# Patient Record
Sex: FEMALE | Race: WHITE | NOT HISPANIC OR LATINO | URBAN - METROPOLITAN AREA
[De-identification: names, ages, dates, MRNs, and addresses within clinical notes are randomized per-mention and may not be internally consistent; named-entity substitution may affect disease eponyms.]

---

## 2022-01-06 ENCOUNTER — APPOINTMENT (OUTPATIENT)
Dept: RADIOLOGY | Facility: CLINIC | Age: 52
End: 2022-01-06
Payer: COMMERCIAL

## 2022-01-06 ENCOUNTER — OFFICE VISIT (OUTPATIENT)
Dept: URGENT CARE | Facility: CLINIC | Age: 52
End: 2022-01-06
Payer: COMMERCIAL

## 2022-01-06 VITALS
HEART RATE: 99 BPM | TEMPERATURE: 99.5 F | OXYGEN SATURATION: 100 % | HEIGHT: 64 IN | WEIGHT: 150.6 LBS | BODY MASS INDEX: 25.71 KG/M2 | RESPIRATION RATE: 16 BRPM

## 2022-01-06 DIAGNOSIS — M79.645 FINGER PAIN, LEFT: ICD-10-CM

## 2022-01-06 DIAGNOSIS — S63.259A DISLOCATION OF FINGER, INITIAL ENCOUNTER: Primary | ICD-10-CM

## 2022-01-06 DIAGNOSIS — S63.259A DISLOCATION OF FINGER, INITIAL ENCOUNTER: ICD-10-CM

## 2022-01-06 PROCEDURE — 73140 X-RAY EXAM OF FINGER(S): CPT

## 2022-01-06 PROCEDURE — 29130 APPL FINGER SPLINT STATIC: CPT | Performed by: PHYSICIAN ASSISTANT

## 2022-01-06 PROCEDURE — 99213 OFFICE O/P EST LOW 20 MIN: CPT | Performed by: PHYSICIAN ASSISTANT

## 2022-01-06 RX ORDER — ALPRAZOLAM 1 MG/1
1 TABLET ORAL 3 TIMES DAILY
COMMUNITY
Start: 2021-12-12

## 2022-01-06 RX ORDER — TRAMADOL HYDROCHLORIDE 50 MG/1
TABLET ORAL
COMMUNITY
Start: 2021-12-15

## 2022-01-06 RX ORDER — CARISOPRODOL 350 MG/1
350 TABLET ORAL DAILY
COMMUNITY
Start: 2021-12-11

## 2022-01-06 RX ORDER — QUETIAPINE FUMARATE 25 MG/1
25 TABLET, FILM COATED ORAL 4 TIMES DAILY
COMMUNITY
Start: 2021-12-01

## 2022-01-06 RX ORDER — LIDOCAINE HYDROCHLORIDE 10 MG/ML
5 INJECTION, SOLUTION EPIDURAL; INFILTRATION; INTRACAUDAL; PERINEURAL ONCE
Status: COMPLETED | OUTPATIENT
Start: 2022-01-06 | End: 2022-01-06

## 2022-01-06 RX ORDER — ATORVASTATIN CALCIUM 20 MG/1
20 TABLET, FILM COATED ORAL DAILY
COMMUNITY
Start: 2021-12-11

## 2022-01-06 RX ORDER — GABAPENTIN 600 MG/1
600 TABLET ORAL 4 TIMES DAILY
COMMUNITY
Start: 2021-12-29

## 2022-01-06 RX ADMIN — LIDOCAINE HYDROCHLORIDE 5 ML: 10 INJECTION, SOLUTION EPIDURAL; INFILTRATION; INTRACAUDAL; PERINEURAL at 15:28

## 2022-01-06 NOTE — LETTER
January 6, 2022     Patient: Onel Gallagher   YOB: 1970   Date of Visit: 1/6/2022       To Whom it May Concern:    Onel Gallagher was seen in my clinic on 1/6/2022  She may return to work on 1/9/2022  If you have any questions or concerns, please don't hesitate to call           Sincerely,          [facility]        CC: No Recipients

## 2022-01-06 NOTE — PROGRESS NOTES
NAME: Denisse Harris is a 45 y o  female  : 1970    MRN: [id number]      Assessment and Plan   Dislocation of finger, initial encounter [S63 259A]  1  Dislocation of finger, initial encounter  lidocaine (PF) (XYLOCAINE-MPF) 1 % injection 5 mL    XR finger left fifth digit-pinkie    Ambulatory referral to Orthopedic Surgery    Orthopedic injury treatment   2  Finger pain, left  XR finger left fifth digit-pinkie     Administrations This Visit     lidocaine (PF) (XYLOCAINE-MPF) 1 % injection 5 mL     Admin Date  2022 Action  Given Dose  5 mL Route  Infiltration Administered By  Cherylynn Backer            x-ray left fifth finger: dislocation at the PIP joint no fractures visualized  Post reduction films show realigned but possible tendon/ligament damage given valgus deformity  Placed in aluminum finger splint and f/u with ortho       Ice, elevate, continue ibuprofen/tylenol  Wear splint until you see ortho  F/u with ortho- referral placed for you today  ER if finger goes completely numb or turns white/ gray  She acknowledges  Patient Instructions   Patient Instructions   Ice, elevate  Continue OTC meds   Wear splint   F/u with ortho - referral placed for you today     Proceed to ER if symptoms worsen  Chief Complaint     Chief Complaint   Patient presents with    Hand Pain     Onset yesterday slammed left 5th finger in door  Takin gtylenol and ibuprofen  Finger discolored and swollen         History of Present Illness   Patient with no pertinent pmhx presents with daughter complaining of left 5th finger pain x 1 day  Reports yesterday she got her 5th finger caught in a shutting door  Reports she has been having pain and swelling to the finger since  No numbness/tingling to the tip  Has been applying ice and taking ibuprofen/tylenol  Throbbing pain at rest  Reports she is unable to bend it  Review of Systems   Review of Systems   Constitutional: Negative for chills and fever  Gastrointestinal: Negative for nausea and vomiting  Musculoskeletal:        Left 5th finger pain    Neurological: Negative for weakness and numbness  Current Medications       Current Outpatient Medications:     ALPRAZolam (XANAX) 1 mg tablet, Take 1 mg by mouth 3 (three) times a day, Disp: , Rfl:     atorvastatin (LIPITOR) 20 mg tablet, Take 20 mg by mouth daily, Disp: , Rfl:     carisoprodol (SOMA) 350 mg tablet, Take 350 mg by mouth daily, Disp: , Rfl:     gabapentin (NEURONTIN) 600 MG tablet, Take 600 mg by mouth 4 (four) times a day, Disp: , Rfl:     hyoscyamine (LEVSIN/SL) 0 125 mg SL tablet, PLACE AND DISSOLVE ONE TABLET UNDER THE TONGUE THREE TIMES A DAY AS NEEDED FOR ABDOMINAL PAIN, Disp: , Rfl:     QUEtiapine (SEROquel) 25 mg tablet, Take 25 mg by mouth 4 (four) times a day, Disp: , Rfl:     traMADol (ULTRAM) 50 mg tablet, TAKE 1 TABLET BY MOUTH 3 TIMES A DAY (EARLIEST FILL 10/24/21), Disp: , Rfl:   No current facility-administered medications for this visit  Current Allergies     Allergies as of 01/06/2022 - Reviewed 01/06/2022   Allergen Reaction Noted    Demerol [meperidine] Vomiting 01/06/2022              History reviewed  No pertinent past medical history  History reviewed  No pertinent surgical history  No family history on file  Medications have been verified      The following portions of the patient's history were reviewed and updated as appropriate: allergies, current medications, past family history, past medical history, past social history, past surgical history and problem list     Objective   Pulse 99   Temp 99 5 °F (37 5 °C) (Tympanic)   Resp 16   Ht 5' 3 5" (1 613 m)   Wt 68 3 kg (150 lb 9 6 oz)   SpO2 100%   BMI 26 26 kg/m²      Orthopedic injury treatment    Date/Time: 1/6/2022 3:45 PM  Performed by: Duarte Fox PA-C  Authorized by: Duarte Fox PA-C     Patient Location:  Clinic  Verbal consent obtained?: Yes    Risks and benefits: Risks, benefits and alternatives were discussed    Consent given by:  Patient  Patient states understanding of procedure being performed: Yes    Patient identity confirmed:  Verbally with patient  Time out: Immediately prior to the procedure a time out was called    Injury location:  Finger  Location details:  Left little finger  Injury type:  Dislocation  Neurovascular status: Neurovascularly intact    Distal perfusion: normal    Neurological function: normal    Range of motion: reduced    Local anesthesia used?: Yes    Anesthesia:  Digital block  Local anesthetic:  Lidocaine 1% without epinephrine  Anesthetic total (ml):  3  Manipulation performed?: Yes    Reduction successful?: Yes    Confirmation: Reduction confirmed by x-ray    Immobilization:  Splint  Splint type:  Finger splint, static  Supplies used:  Aluminum splint  Neurovascular status: Neurovascularly intact    Distal perfusion: normal    Neurological function: normal    Range of motion: normal    Patient tolerance:  Patient tolerated the procedure well with no immediate complications        Physical Exam     Physical Exam  Vitals and nursing note reviewed  Constitutional:       General: She is not in acute distress  Appearance: Normal appearance  She is not ill-appearing, toxic-appearing or diaphoretic  Cardiovascular:      Rate and Rhythm: Normal rate and regular rhythm  Pulmonary:      Effort: Pulmonary effort is normal  No respiratory distress  Musculoskeletal:      Comments: Left 5th finger: edema and ecchymosis to the proximal and middle phalanx  Small abrasion overlying as well - no gaping wound or any active bleeding  No subungual hematoma  TTP at the proximal, middle and distal phalanx  NTTP at the MCP joint or anywhere in the hand  Unable to flex finger at all  Holds it in extension  Other fingers able to fully flex  Sensation to light touch intact throughout the finger   Cap refill at the distal tip <2 seconds    Neurological:      Mental Status: She is alert and oriented to person, place, and time

## 2022-01-11 ENCOUNTER — OFFICE VISIT (OUTPATIENT)
Dept: OBGYN CLINIC | Facility: CLINIC | Age: 52
End: 2022-01-11

## 2022-01-11 VITALS
DIASTOLIC BLOOD PRESSURE: 80 MMHG | BODY MASS INDEX: 26.46 KG/M2 | WEIGHT: 155 LBS | SYSTOLIC BLOOD PRESSURE: 128 MMHG | HEIGHT: 64 IN

## 2022-01-11 DIAGNOSIS — S63.287A DISLOCATION OF PROXIMAL INTERPHALANGEAL JOINT OF LEFT LITTLE FINGER, INITIAL ENCOUNTER: Primary | ICD-10-CM

## 2022-01-11 PROBLEM — S63.297A: Status: ACTIVE | Noted: 2022-01-11

## 2022-01-11 PROCEDURE — 99203 OFFICE O/P NEW LOW 30 MIN: CPT | Performed by: ORTHOPAEDIC SURGERY

## 2022-01-11 NOTE — PROGRESS NOTES
Assessment:     1  Dislocation of proximal interphalangeal joint of left little finger, initial encounter        Plan:     Problem List Items Addressed This Visit        Musculoskeletal and Integument    Dislocation of proximal interphalangeal joint of left little finger - Primary     44 y/o female with an acute dislocation of her DIP joint of the left small finger sustained on 1/5/2022  Patient was instructed to perform ROM exercises for her small finger PIP and DIP joints as shown in the office today  She was placed in kral taping today, may remove for hygiene  OTC meds and ice prn for pain relief  Work note provided for the patient today, keeping the patient out of work until 1/25/2022, as she works as a  and is unable to perform her duties at this time  She will follow up in 6 weeks for re evaluation of symptoms  All patient's questions were answered to her satisfaction  This note is created using dictation transcription  It may contain typographical errors, grammatical errors, improperly dictated words, background noise and other errors  Subjective:     Patient ID: Arturo Vera is a 45 y o  female  Chief Complaint:  44 y/o female who presents today for an orthopedic surgery consultation visit at the request of Antionette Adams PA-C on 1/6/2022 for acute left small finger dislocation sustained on 1/5/2022 after she slammed the finger jef car door  Today, patient notes continued soreness and pain about the small finger  She has been using a splint as instructed by the urgent care  She has also been taking OTC meds prn for pain relief  No numbness or tingling  No fevers or chills  Information on patient's intake form was reviewed  Allergy:  Allergies   Allergen Reactions    Demerol [Meperidine] Vomiting     Medications:  all current active meds have been reviewed  Past Medical History:  History reviewed  No pertinent past medical history  Past Surgical History:  History reviewed   No pertinent surgical history  Family History:  History reviewed  No pertinent family history  Social History:  Social History     Substance and Sexual Activity   Alcohol Use None     Social History     Substance and Sexual Activity   Drug Use Not on file     Social History     Tobacco Use   Smoking Status Current Every Day Smoker   Smokeless Tobacco Never Used     Review of Systems   Constitutional: Negative for chills, fever and unexpected weight change  HENT: Negative for hearing loss, nosebleeds and sore throat  Eyes: Negative for pain, redness and visual disturbance  Respiratory: Negative for cough, shortness of breath and wheezing  Cardiovascular: Negative for chest pain, palpitations and leg swelling  Gastrointestinal: Negative for abdominal distention, nausea and vomiting  Endocrine: Negative for polydipsia and polyuria  Genitourinary: Negative for dysuria and hematuria  Musculoskeletal: Positive for arthralgias (Left little finger) and joint swelling (Left little finger)  Skin: Negative for rash and wound  Neurological: Negative for dizziness, numbness and headaches  Psychiatric/Behavioral: Negative for decreased concentration and suicidal ideas  Objective:  BP Readings from Last 1 Encounters:   01/11/22 128/80      Wt Readings from Last 1 Encounters:   01/11/22 70 3 kg (155 lb)      BMI:   Estimated body mass index is 27 03 kg/m² as calculated from the following:    Height as of this encounter: 5' 3 5" (1 613 m)  Weight as of this encounter: 70 3 kg (155 lb)  BSA:   Estimated body surface area is 1 75 meters squared as calculated from the following:    Height as of this encounter: 5' 3 5" (1 613 m)  Weight as of this encounter: 70 3 kg (155 lb)  Physical Exam  Vitals and nursing note reviewed  Constitutional:       Appearance: Normal appearance  She is well-developed  HENT:      Head: Normocephalic and atraumatic        Right Ear: External ear normal       Left Ear: External ear normal    Eyes:      Extraocular Movements: Extraocular movements intact  Conjunctiva/sclera: Conjunctivae normal    Pulmonary:      Effort: Pulmonary effort is normal    Musculoskeletal:      Cervical back: Neck supple  Skin:     General: Skin is warm and dry  Neurological:      Mental Status: She is alert and oriented to person, place, and time  Deep Tendon Reflexes: Reflexes are normal and symmetric  Psychiatric:         Mood and Affect: Mood normal          Behavior: Behavior normal        Left Hand Exam     Tenderness   Left hand tenderness location: small finger  Range of Motion   Hand   PIP Little: abnormal   DIP Little: abnormal     Other   Erythema: absent  Sensation: normal  Pulse: present    Comments:  Ecchymosis noted about the small finger  Limited ROM of the small finger due to pain and swelling  I have personally reviewed pertinent films in PACS and my interpretation is x rays right hand, pre reduction 1/6/2022 reveals dislocation of the PIP joint of the small finger  Also, reviewed x rays of the right hand, post reduction on 1/6/2022 which reveals interval reduction of the PIP joint of the small finger without acute fracture  Well maintained joint line        Scribe Attestation    I,:  Xin Vang am acting as a scribe while in the presence of the attending physician :       I,:  Tin Ashley MD personally performed the services described in this documentation    as scribed in my presence :

## 2022-01-11 NOTE — LETTER
January 11, 2022     Patient: Carroll Bentley   YOB: 1970   Date of Visit: 1/11/2022       To Whom it May Concern:    Carroll Bentley is under my professional care  She was seen in my office on 1/11/2022  She is to remain out of work until 1/25/2022  She may return to work after this date without limitations or restrictions  If you have any questions or concerns, please don't hesitate to call           Sincerely,          Chris Griffith MD        CC: No Recipients

## 2022-01-11 NOTE — ASSESSMENT & PLAN NOTE
44 y/o female with an acute dislocation of her DIP joint of the left small finger sustained on 1/5/2022  Patient was instructed to perform ROM exercises for her small finger PIP and DIP joints as shown in the office today  She was placed in karl taping today, may remove for hygiene  OTC meds and ice prn for pain relief  Work note provided for the patient today, keeping the patient out of work until 1/25/2022, as she works as a  and is unable to perform her duties at this time  She will follow up in 6 weeks for re evaluation of symptoms  All patient's questions were answered to her satisfaction  This note is created using dictation transcription  It may contain typographical errors, grammatical errors, improperly dictated words, background noise and other errors

## 2022-01-11 NOTE — LETTER
January 11, 2022     Patient: Carmel Werner   YOB: 1970   Date of Visit: 1/11/2022       To Whom it May Concern:    Carmel Werner is under my professional care  She was seen in my office on 1/11/2022  She is to remain out of work until 11/25/2022  She may then return to work without limitations  If you have any questions or concerns, please don't hesitate to call           Sincerely,          Stephan Torres MD        CC: No Recipients

## 2022-01-17 ENCOUNTER — TELEPHONE (OUTPATIENT)
Dept: OBGYN CLINIC | Facility: HOSPITAL | Age: 52
End: 2022-01-17

## 2022-01-17 NOTE — TELEPHONE ENCOUNTER
Patient would like to return to work sooner than the last note states  Patient states Dr Luly Vazquez told her to call to request another note  Please change note to advise she can return to work 01/20/22  Please advise  Patient can  the letter when ready      Callback OB#[phone]

## 2022-01-17 NOTE — LETTER
2022     Patient: Edilma Saldana   YOB: 1970   Date of Visit: 2022       To Whom it May Concern:    Edilma Saldana is under my professional care  She can return to work with no restrictions on 22  If you have any questions or concerns, please don't hesitate to call           Sincerely,          Ladonna Esparza MD        CC: No Recipients

## 2023-10-10 ENCOUNTER — HOSPITAL ENCOUNTER (EMERGENCY)
Facility: HOSPITAL | Age: 53
Discharge: HOME | End: 2023-10-10
Attending: EMERGENCY MEDICINE | Admitting: EMERGENCY MEDICINE
Payer: MEDICARE

## 2023-10-10 VITALS
DIASTOLIC BLOOD PRESSURE: 71 MMHG | SYSTOLIC BLOOD PRESSURE: 100 MMHG | TEMPERATURE: 97.4 F | OXYGEN SATURATION: 97 % | HEART RATE: 89 BPM | RESPIRATION RATE: 20 BRPM

## 2023-10-10 DIAGNOSIS — G89.29 CHRONIC NECK PAIN: Primary | ICD-10-CM

## 2023-10-10 DIAGNOSIS — M54.2 CHRONIC NECK PAIN: Primary | ICD-10-CM

## 2023-10-10 PROCEDURE — 99282 EMERGENCY DEPT VISIT SF MDM: CPT

## 2023-10-10 PROCEDURE — 99284 EMERGENCY DEPT VISIT MOD MDM: CPT | Performed by: PHYSICIAN ASSISTANT

## 2023-10-10 PROCEDURE — 96372 THER/PROPH/DIAG INJ SC/IM: CPT

## 2023-10-10 RX ORDER — LIDOCAINE 50 MG/G
1 PATCH TOPICAL ONCE
Status: DISCONTINUED | OUTPATIENT
Start: 2023-10-10 | End: 2023-10-10 | Stop reason: HOSPADM

## 2023-10-10 RX ORDER — METHOCARBAMOL 500 MG/1
500 TABLET, FILM COATED ORAL ONCE
Status: COMPLETED | OUTPATIENT
Start: 2023-10-10 | End: 2023-10-10

## 2023-10-10 RX ORDER — KETOROLAC TROMETHAMINE 30 MG/ML
30 INJECTION, SOLUTION INTRAMUSCULAR; INTRAVENOUS ONCE
Status: COMPLETED | OUTPATIENT
Start: 2023-10-10 | End: 2023-10-10

## 2023-10-10 RX ADMIN — LIDOCAINE 1 PATCH: 700 PATCH TOPICAL at 16:18

## 2023-10-10 RX ADMIN — METHOCARBAMOL 500 MG: 500 TABLET ORAL at 16:17

## 2023-10-10 RX ADMIN — KETOROLAC TROMETHAMINE 30 MG: 30 INJECTION, SOLUTION INTRAMUSCULAR; INTRAVENOUS at 16:18

## 2023-12-22 ENCOUNTER — TELEPHONE (OUTPATIENT)
Dept: PSYCHIATRY | Facility: CLINIC | Age: 53
End: 2023-12-22

## 2024-04-08 ENCOUNTER — TELEPHONE (OUTPATIENT)
Dept: PSYCHIATRY | Facility: CLINIC | Age: 54
End: 2024-04-08

## 2025-01-02 ENCOUNTER — APPOINTMENT (EMERGENCY)
Dept: CT IMAGING | Facility: HOSPITAL | Age: 55
End: 2025-01-02
Payer: MEDICARE

## 2025-01-02 ENCOUNTER — APPOINTMENT (EMERGENCY)
Dept: RADIOLOGY | Facility: HOSPITAL | Age: 55
End: 2025-01-02
Payer: MEDICARE

## 2025-01-02 ENCOUNTER — HOSPITAL ENCOUNTER (EMERGENCY)
Facility: HOSPITAL | Age: 55
Discharge: HOME | End: 2025-01-02
Attending: EMERGENCY MEDICINE
Payer: MEDICARE

## 2025-01-02 VITALS
RESPIRATION RATE: 18 BRPM | HEART RATE: 90 BPM | DIASTOLIC BLOOD PRESSURE: 87 MMHG | TEMPERATURE: 98.3 F | OXYGEN SATURATION: 96 % | SYSTOLIC BLOOD PRESSURE: 150 MMHG

## 2025-01-02 DIAGNOSIS — S42.213A HUMERUS SURGICAL NECK FRACTURE: Primary | ICD-10-CM

## 2025-01-02 DIAGNOSIS — S01.01XA SCALP LACERATION: ICD-10-CM

## 2025-01-02 LAB
ALBUMIN SERPL BCG-MCNC: 4.1 G/DL (ref 3.5–5)
ALP SERPL-CCNC: 129 U/L (ref 34–104)
ALT SERPL W P-5'-P-CCNC: 10 U/L (ref 7–52)
ANION GAP SERPL CALCULATED.3IONS-SCNC: 11 MMOL/L (ref 4–13)
AST SERPL W P-5'-P-CCNC: 15 U/L (ref 13–39)
ATRIAL RATE: 92 BPM
BASOPHILS # BLD AUTO: 0.06 THOUSANDS/ΜL (ref 0–0.1)
BASOPHILS NFR BLD AUTO: 0 % (ref 0–1)
BILIRUB SERPL-MCNC: 0.39 MG/DL (ref 0.2–1)
BNP SERPL-MCNC: 77 PG/ML (ref 0–100)
BUN SERPL-MCNC: 11 MG/DL (ref 5–25)
CALCIUM SERPL-MCNC: 9.2 MG/DL (ref 8.4–10.2)
CARDIAC TROPONIN I PNL SERPL HS: <2 NG/L (ref ?–50)
CHLORIDE SERPL-SCNC: 103 MMOL/L (ref 96–108)
CO2 SERPL-SCNC: 25 MMOL/L (ref 21–32)
CREAT SERPL-MCNC: 0.79 MG/DL (ref 0.6–1.3)
EOSINOPHIL # BLD AUTO: 0.09 THOUSAND/ΜL (ref 0–0.61)
EOSINOPHIL NFR BLD AUTO: 0 % (ref 0–6)
ERYTHROCYTE [DISTWIDTH] IN BLOOD BY AUTOMATED COUNT: 14.6 % (ref 11.6–15.1)
GFR SERPL CREATININE-BSD FRML MDRD: 84 ML/MIN/1.73SQ M
GLUCOSE SERPL-MCNC: 161 MG/DL (ref 65–140)
HCT VFR BLD AUTO: 44.9 % (ref 34.8–46.1)
HGB BLD-MCNC: 14.7 G/DL (ref 11.5–15.4)
IMM GRANULOCYTES # BLD AUTO: 0.29 THOUSAND/UL (ref 0–0.2)
IMM GRANULOCYTES NFR BLD AUTO: 1 % (ref 0–2)
LYMPHOCYTES # BLD AUTO: 2.84 THOUSANDS/ΜL (ref 0.6–4.47)
LYMPHOCYTES NFR BLD AUTO: 12 % (ref 14–44)
MAGNESIUM SERPL-MCNC: 1.9 MG/DL (ref 1.9–2.7)
MCH RBC QN AUTO: 29.8 PG (ref 26.8–34.3)
MCHC RBC AUTO-ENTMCNC: 32.7 G/DL (ref 31.4–37.4)
MCV RBC AUTO: 91 FL (ref 82–98)
MONOCYTES # BLD AUTO: 0.78 THOUSAND/ΜL (ref 0.17–1.22)
MONOCYTES NFR BLD AUTO: 3 % (ref 4–12)
NEUTROPHILS # BLD AUTO: 19.54 THOUSANDS/ΜL (ref 1.85–7.62)
NEUTS SEG NFR BLD AUTO: 84 % (ref 43–75)
NRBC BLD AUTO-RTO: 0 /100 WBCS
P AXIS: 80 DEGREES
PLATELET # BLD AUTO: 273 THOUSANDS/UL (ref 149–390)
PMV BLD AUTO: 10.5 FL (ref 8.9–12.7)
POTASSIUM SERPL-SCNC: 3.5 MMOL/L (ref 3.5–5.3)
PR INTERVAL: 148 MS
PROT SERPL-MCNC: 7.4 G/DL (ref 6.4–8.4)
QRS AXIS: 80 DEGREES
QRSD INTERVAL: 80 MS
QT INTERVAL: 368 MS
QTC INTERVAL: 455 MS
RBC # BLD AUTO: 4.94 MILLION/UL (ref 3.81–5.12)
SODIUM SERPL-SCNC: 139 MMOL/L (ref 135–147)
T WAVE AXIS: 73 DEGREES
VENTRICULAR RATE: 92 BPM
WBC # BLD AUTO: 23.6 THOUSAND/UL (ref 4.31–10.16)

## 2025-01-02 PROCEDURE — 84484 ASSAY OF TROPONIN QUANT: CPT | Performed by: EMERGENCY MEDICINE

## 2025-01-02 PROCEDURE — 99285 EMERGENCY DEPT VISIT HI MDM: CPT

## 2025-01-02 PROCEDURE — 96361 HYDRATE IV INFUSION ADD-ON: CPT

## 2025-01-02 PROCEDURE — 73200 CT UPPER EXTREMITY W/O DYE: CPT

## 2025-01-02 PROCEDURE — 70450 CT HEAD/BRAIN W/O DYE: CPT

## 2025-01-02 PROCEDURE — 96374 THER/PROPH/DIAG INJ IV PUSH: CPT

## 2025-01-02 PROCEDURE — 83735 ASSAY OF MAGNESIUM: CPT | Performed by: EMERGENCY MEDICINE

## 2025-01-02 PROCEDURE — 71045 X-RAY EXAM CHEST 1 VIEW: CPT

## 2025-01-02 PROCEDURE — 85025 COMPLETE CBC W/AUTO DIFF WBC: CPT | Performed by: EMERGENCY MEDICINE

## 2025-01-02 PROCEDURE — 36415 COLL VENOUS BLD VENIPUNCTURE: CPT | Performed by: EMERGENCY MEDICINE

## 2025-01-02 PROCEDURE — 96375 TX/PRO/DX INJ NEW DRUG ADDON: CPT

## 2025-01-02 PROCEDURE — 73030 X-RAY EXAM OF SHOULDER: CPT

## 2025-01-02 PROCEDURE — 93005 ELECTROCARDIOGRAM TRACING: CPT

## 2025-01-02 PROCEDURE — 80053 COMPREHEN METABOLIC PANEL: CPT | Performed by: EMERGENCY MEDICINE

## 2025-01-02 PROCEDURE — 99285 EMERGENCY DEPT VISIT HI MDM: CPT | Performed by: EMERGENCY MEDICINE

## 2025-01-02 PROCEDURE — 96376 TX/PRO/DX INJ SAME DRUG ADON: CPT

## 2025-01-02 PROCEDURE — 83880 ASSAY OF NATRIURETIC PEPTIDE: CPT | Performed by: EMERGENCY MEDICINE

## 2025-01-02 RX ORDER — MORPHINE SULFATE 4 MG/ML
4 INJECTION, SOLUTION INTRAMUSCULAR; INTRAVENOUS ONCE
Status: COMPLETED | OUTPATIENT
Start: 2025-01-02 | End: 2025-01-02

## 2025-01-02 RX ORDER — ONDANSETRON 2 MG/ML
4 INJECTION INTRAMUSCULAR; INTRAVENOUS ONCE
Status: COMPLETED | OUTPATIENT
Start: 2025-01-02 | End: 2025-01-02

## 2025-01-02 RX ORDER — LORAZEPAM 2 MG/ML
0.5 INJECTION INTRAMUSCULAR ONCE
Status: COMPLETED | OUTPATIENT
Start: 2025-01-02 | End: 2025-01-02

## 2025-01-02 RX ORDER — OXYCODONE AND ACETAMINOPHEN 5; 325 MG/1; MG/1
1 TABLET ORAL ONCE
Refills: 0 | Status: COMPLETED | OUTPATIENT
Start: 2025-01-02 | End: 2025-01-02

## 2025-01-02 RX ORDER — ONDANSETRON 4 MG/1
4 TABLET, FILM COATED ORAL EVERY 6 HOURS
Qty: 12 TABLET | Refills: 0 | Status: SHIPPED | OUTPATIENT
Start: 2025-01-02 | End: 2025-01-14

## 2025-01-02 RX ORDER — OXYCODONE AND ACETAMINOPHEN 5; 325 MG/1; MG/1
1 TABLET ORAL EVERY 4 HOURS PRN
Qty: 20 TABLET | Refills: 0 | Status: SHIPPED | OUTPATIENT
Start: 2025-01-02 | End: 2025-01-08

## 2025-01-02 RX ADMIN — OXYCODONE HYDROCHLORIDE AND ACETAMINOPHEN 1 TABLET: 5; 325 TABLET ORAL at 22:30

## 2025-01-02 RX ADMIN — ONDANSETRON 4 MG: 2 INJECTION, SOLUTION INTRAMUSCULAR; INTRAVENOUS at 18:53

## 2025-01-02 RX ADMIN — MORPHINE SULFATE 4 MG: 4 INJECTION, SOLUTION INTRAMUSCULAR; INTRAVENOUS at 19:19

## 2025-01-02 RX ADMIN — LORAZEPAM 0.5 MG: 2 INJECTION INTRAMUSCULAR; INTRAVENOUS at 19:59

## 2025-01-02 RX ADMIN — SODIUM CHLORIDE 1000 ML: 0.9 INJECTION, SOLUTION INTRAVENOUS at 18:56

## 2025-01-02 RX ADMIN — ONDANSETRON 4 MG: 2 INJECTION, SOLUTION INTRAMUSCULAR; INTRAVENOUS at 19:59

## 2025-01-02 NOTE — Clinical Note
Sarah Fisher was seen and treated in our emergency department on 1/2/2025.                Diagnosis:     Sarah  .    She may return on this date: 01/05/2025         If you have any questions or concerns, please don't hesitate to call.      Madi Diaz, DO    ______________________________           _______________          _______________  Hospital Representative                              Date                                Time

## 2025-01-02 NOTE — Clinical Note
accompanied Sarah Fisher to the emergency department on 1/2/2025.    Return date if applicable: 01/04/2025        If you have any questions or concerns, please don't hesitate to call.      Madi Diaz, DO

## 2025-01-03 ENCOUNTER — TELEPHONE (OUTPATIENT)
Age: 55
End: 2025-01-03

## 2025-01-03 NOTE — DISCHARGE INSTRUCTIONS
As discussed, please call orthopedics tomorrow morning to have follow-up scheduled for further care of your shoulder fracture.  If you have concerns for any reason please return to the ED for further assessment.

## 2025-01-03 NOTE — TELEPHONE ENCOUNTER
Hello,    Please advise if a forced appointment can be accommodated for the patient:    Call back #: [phone]    Insurance:     Reason for appointment: Right Humerus surgical neck Frx    Requested doctor and/or location: [facility]- Does not want to wait until the 14th only wished to go to [place]      Thank you.

## 2025-01-03 NOTE — ED PROVIDER NOTES
Time reflects when diagnosis was documented in both MDM as applicable and the Disposition within this note       Time User Action Codes Description Comment    1/2/2025 10:16 PM Madi Diaz [S42.213A] Humerus surgical neck fracture     1/2/2025 10:18 PM Madi Diaz [S01.01XA] Scalp laceration           ED Disposition       ED Disposition   Discharge    Condition   Stable    Date/Time   u Jan 2, 2025 10:16 PM    Comment   Sarah Phillip discharge to home/self care.                   Assessment & Plan       Medical Decision Making     Reviewed past medical records: Yes, no recent hospitalizations noted     History Provided by: EMS, patient     Differential considered: Skull fracture, closed head injury, concussion, shoulder fracture, dislocation.  Metabolic encephalopathy     Consideration of tests: Patient CT of the head was negative for acute fracture or intracranial pathology.  Does have a hematoma.  Was offered laceration repair for forehead injury.  Patient declined repair in the emergency room.  Patient was also offered updated tetanus which she declined.  Patient to x-ray revealed proximal humerus fracture at the surgical neck.  Images were sent to and reviewed by orthopedics.  If patient's pain can be controlled, can be scheduled for outpatient monitoring and possible surgery for repair of fracture.  Patient was placed in sling, experience significant pain relief.  Was able to ambulate safely in the emergency room.  Was offered admission for monitoring, safety and physical therapy evaluation.  Patient states that she would prefer to be discharged from the emergency room and will follow-up and return if she is unable to care for herself.  Patient's daughter was at the bedside and had no objections to the patient's plan.  Will provide home analgesia prescription, antiemetics.  Patient is otherwise stable for discharge.       I have reviewed the patient's visit and any testing done in the  emergency department.  They have verbalized their understanding of any testing done today and have no further questions or concerns regarding their care in the emergency room.  They will follow up with their primary care physician as well as with any specialist in their discharge instructions.  Strict return precautions were discussed.    Amount and/or Complexity of Data Reviewed  Labs: ordered.  Radiology: ordered and independent interpretation performed.    Risk  Prescription drug management.             Medications   sodium chloride 0.9 % bolus 1,000 mL (0 mL Intravenous Stopped 1/2/25 1956)   ondansetron (ZOFRAN) injection 4 mg (4 mg Intravenous Given 1/2/25 1853)   morphine injection 4 mg (4 mg Intravenous Given 1/2/25 1919)   ondansetron (ZOFRAN) injection 4 mg (4 mg Intravenous Given 1/2/25 1959)   LORazepam (ATIVAN) injection 0.5 mg (0.5 mg Intravenous Given 1/2/25 1959)   oxyCODONE-acetaminophen (PERCOCET) 5-325 mg per tablet 1 tablet (1 tablet Oral Given 1/2/25 2230)       ED Risk Strat Scores                          SBIRT 20yo+      Flowsheet Row Most Recent Value   Initial Alcohol Screen: US AUDIT-C     1. How often do you have a drink containing alcohol? 0 Filed at: 01/02/2025 1816   2. How many drinks containing alcohol do you have on a typical day you are drinking?  0 Filed at: 01/02/2025 1816   3a. Male UNDER 65: How often do you have five or more drinks on one occasion? 0 Filed at: 01/02/2025 1816   3b. FEMALE Any Age, or MALE 65+: How often do you have 4 or more drinks on one occassion? 0 Filed at: 01/02/2025 1816   Audit-C Score 0 Filed at: 01/02/2025 1816   DUANE: How many times in the past year have you...    Used an illegal drug or used a prescription medication for non-medical reasons? Never Filed at: 01/02/2025 1816                            History of Present Illness       Chief Complaint   Patient presents with    Fall     Pt reports being on robaxin, took two doses today, causing her to  "fall. Right shoulder injury, head injury, no loc, no thinners.        History reviewed. No pertinent past medical history.   History reviewed. No pertinent surgical history.   History reviewed. No pertinent family history.   Social History     Tobacco Use    Smoking status: Every Day     Current packs/day: 0.50     Types: Cigarettes    Smokeless tobacco: Never   Substance Use Topics    Alcohol use: Never    Drug use: Yes     Types: Marijuana      E-Cigarette/Vaping      E-Cigarette/Vaping Substances      I have reviewed and agree with the history as documented.     Patient presents with:  Fall: Pt reports being on robaxin, took two doses today, causing her to fall. Right shoulder injury, head injury, no loc, no thinners.     54-year-old female who states that she took 2 Robaxin to help with her chronic cervical radiculopathy.  States that after taking the second Robaxin she stood up felt weak in her legs and fell to the side.  Initially thought that she could not remember all the events.  Statements that she does remember hitting the floor lying there, woke up with right shoulder pain and noticed bleeding from her right forehead.  EMS called, transported patient to ED.  Patient has had some nausea since fall.  Denies being on any anticoagulants.  Denies any neck pain.  She complains of pain to right shoulder.  Can feel a \"clicking inside\".        Review of Systems   Constitutional: Negative.  Negative for chills and fever.   HENT: Negative.  Negative for rhinorrhea, sore throat, trouble swallowing and voice change.    Eyes: Negative.  Negative for pain and visual disturbance.   Respiratory: Negative.  Negative for cough, shortness of breath and wheezing.    Cardiovascular: Negative.  Negative for chest pain and palpitations.   Gastrointestinal:  Negative for abdominal pain, diarrhea, nausea and vomiting.   Genitourinary: Negative.  Negative for dysuria and frequency.   Musculoskeletal:  Positive for arthralgias. " Negative for neck pain and neck stiffness.   Skin:  Positive for wound. Negative for rash.   Neurological:  Positive for headaches. Negative for dizziness, speech difficulty, weakness, light-headedness and numbness.           Objective       ED Triage Vitals   Temperature Pulse Blood Pressure Respirations SpO2 Patient Position - Orthostatic VS   01/02/25 1700 01/02/25 1700 01/02/25 1700 01/02/25 1700 01/02/25 1830 --   98.3 °F (36.8 °C) 92 162/82 16 97 %       Temp Source Heart Rate Source BP Location FiO2 (%) Pain Score    01/02/25 1700 01/02/25 1830 -- -- 01/02/25 1919    Oral Monitor   10 - Worst Possible Pain      Vitals      Date and Time Temp Pulse SpO2 Resp BP Pain Score FACES Pain Rating User   01/02/25 2230 -- -- -- -- -- 10 - Worst Possible Pain --    01/02/25 2143 -- 90 96 % 18 150/87 -- --    01/02/25 2015 -- 94 95 % 17 148/85 -- --    01/02/25 2000 -- 88 95 % 17 129/78 -- --    01/02/25 1943 -- -- -- -- -- 7 --    01/02/25 1930 -- 92 94 % 17 136/68 -- --    01/02/25 1919 -- -- -- -- -- 10 - Worst Possible Pain --    01/02/25 1915 -- 88 93 % 17 149/83 -- --    01/02/25 1830 -- 88 97 % 17 162/82 -- --    01/02/25 1700 98.3 °F (36.8 °C) 92 -- 16 162/82 -- -- TH            Physical Exam  Vitals and nursing note reviewed.   Constitutional:       General: She is not in acute distress.     Appearance: She is well-developed.   HENT:      Head: Normocephalic and atraumatic.        Comments: Approximately half centimeter laceration to the scalp just proximal to the hairline.  Eyes:      Conjunctiva/sclera: Conjunctivae normal.      Pupils: Pupils are equal, round, and reactive to light.   Neck:      Trachea: No tracheal deviation.   Cardiovascular:      Rate and Rhythm: Normal rate and regular rhythm.   Pulmonary:      Effort: Pulmonary effort is normal. No respiratory distress.      Breath sounds: Normal breath sounds. No wheezing or rales.   Abdominal:      General: Bowel sounds are normal.  There is no distension.      Palpations: Abdomen is soft.      Tenderness: There is no abdominal tenderness. There is no guarding or rebound.   Musculoskeletal:         General: No tenderness or deformity. Normal range of motion.        Arms:       Cervical back: Normal range of motion and neck supple.      Comments: Significant tenderness and swelling to the right shoulder.  No pain along the distal humerus, elbow.   strength and sensation in intact in right hand.  2+ pulses in the bilateral radial arteries.   Skin:     General: Skin is warm and dry.      Capillary Refill: Capillary refill takes less than 2 seconds.      Findings: No rash.   Neurological:      Mental Status: She is alert and oriented to person, place, and time.   Psychiatric:         Behavior: Behavior normal.         Results Reviewed       Procedure Component Value Units Date/Time    HS Troponin 0hr (reflex protocol) [[id number]]  (Normal) Collected: 01/02/25 1830    Lab Status: Final result Specimen: Blood from Arm, Left Updated: 01/02/25 1858     hs TnI 0hr <2 ng/L     B-Type Natriuretic Peptide(BNP) [[id number]]  (Normal) Collected: 01/02/25 1830    Lab Status: Final result Specimen: Blood from Arm, Left Updated: 01/02/25 1858     BNP 77 pg/mL     Comprehensive metabolic panel [[id number]]  (Abnormal) Collected: 01/02/25 1830    Lab Status: Final result Specimen: Blood from Arm, Left Updated: 01/02/25 1851     Sodium 139 mmol/L      Potassium 3.5 mmol/L      Chloride 103 mmol/L      CO2 25 mmol/L      ANION GAP 11 mmol/L      BUN 11 mg/dL      Creatinine 0.79 mg/dL      Glucose 161 mg/dL      Calcium 9.2 mg/dL      AST 15 U/L      ALT 10 U/L      Alkaline Phosphatase 129 U/L      Total Protein 7.4 g/dL      Albumin 4.1 g/dL      Total Bilirubin 0.39 mg/dL      eGFR 84 ml/min/1.73sq m     Narrative:      National Kid[employer] Foundation guidelines for Chronic Kidney Disease (CKD):     Stage 1 with normal or high GFR (GFR > 90 mL/min/1.73  square meters)    Stage 2 Mild CKD (GFR = 60-89 mL/min/1.73 square meters)    Stage 3A Moderate CKD (GFR = 45-59 mL/min/1.73 square meters)    Stage 3B Moderate CKD (GFR = 30-44 mL/min/1.73 square meters)    Stage 4 Severe CKD (GFR = 15-29 mL/min/1.73 square meters)    Stage 5 End Stage CKD (GFR <15 mL/min/1.73 square meters)  Note: GFR calculation is accurate only with a steady state creatinine    Magnesium [[id number]]  (Normal) Collected: 01/02/25 1830    Lab Status: Final result Specimen: Blood from Arm, Left Updated: 01/02/25 1851     Magnesium 1.9 mg/dL     CBC and differential [[id number]]  (Abnormal) Collected: 01/02/25 1830    Lab Status: Final result Specimen: Blood from Arm, Left Updated: 01/02/25 1837     WBC 23.60 Thousand/uL      RBC 4.94 Million/uL      Hemoglobin 14.7 g/dL      Hematocrit 44.9 %      MCV 91 fL      MCH 29.8 pg      MCHC 32.7 g/dL      RDW 14.6 %      MPV 10.5 fL      Platelets 273 Thousands/uL      nRBC 0 /100 WBCs      Segmented % 84 %      Immature Grans % 1 %      Lymphocytes % 12 %      Monocytes % 3 %      Eosinophils Relative 0 %      Basophils Relative 0 %      Absolute Neutrophils 19.54 Thousands/µL      Absolute Immature Grans 0.29 Thousand/uL      Absolute Lymphocytes 2.84 Thousands/µL      Absolute Monocytes 0.78 Thousand/µL      Eosinophils Absolute 0.09 Thousand/µL      Basophils Absolute 0.06 Thousands/µL             CT upper extremity wo contrast right   Final Interpretation by Fay Schneider MD (01/02 2145)      Comminuted fracture at the surgical neck of the right humerus with anterior displacement of the distal fracture fragment measuring the entire shaft width. Glenohumeral joint intact         Workstation performed: [id number]         CT head without contrast   Final Interpretation by Ezra Cuadra MD (01/02 1952)      No acute intracranial abnormality.                  Workstation performed: [id number]         XR shoulder 2+ views RIGHT   ED Interpretation by  Madi Diaz DO (01/02 1918)   Humeral head fx with angulation      Final Interpretation by Philippe Darnell MD (01/03 0747)      Acute comminuted mildly displaced fracture of the surgical neck of the right humerus.         Computerized Assisted Algorithm (CAA) may have been used to analyze all applicable images.         Workstation performed: [id number]         XR chest 1 view portable   ED Interpretation by Madi Diaz DO (01/02 1919)   Right humeral head fx. Otherwise no acute fx, ptx or pna appreciated.       Final Interpretation by Philippe Darnell MD (01/03 0748)      No acute cardiopulmonary disease.   Acute right humeral surgical neck fracture.            Workstation performed: [id number]             Procedures    ED Medication and Procedure Management   Prior to Admission Medications   Prescriptions Last Dose Informant Patient Reported? Taking?   ALPRAZolam (XANAX) 1 mg tablet   Yes No   Sig: Take 1 mg by mouth 3 (three) times a day   QUEtiapine (SEROquel) 25 mg tablet   Yes No   Sig: Take 25 mg by mouth 4 (four) times a day   atorvastatin (LIPITOR) 20 mg tablet   Yes No   Sig: Take 20 mg by mouth daily   carisoprodol (SOMA) 350 mg tablet   Yes No   Sig: Take 350 mg by mouth daily   gabapentin (NEURONTIN) 600 MG tablet   Yes No   Sig: Take 600 mg by mouth 4 (four) times a day   hyoscyamine (LEVSIN/SL) 0.125 mg SL tablet   Yes No   Sig: PLACE AND DISSOLVE ONE TABLET UNDER THE TONGUE THREE TIMES A DAY AS NEEDED FOR ABDOMINAL PAIN   traMADol (ULTRAM) 50 mg tablet   Yes No   Sig: TAKE 1 TABLET BY MOUTH 3 TIMES A DAY (EARLIEST FILL 10/24/21)      Facility-Administered Medications: None     Discharge Medication List as of 1/2/2025 10:19 PM        START taking these medications    Details   ondansetron (ZOFRAN) 4 mg tablet Take 1 tablet (4 mg total) by mouth every 6 (six) hours, Starting Thu 1/2/2025, Normal      oxyCODONE-acetaminophen (Percocet) 5-325 mg per tablet Take 1 tablet by  mouth every 4 (four) hours as needed for moderate pain for up to 20 doses Max Daily Amount: 6 tablets, Starting Thu 1/2/2025, Normal           CONTINUE these medications which have NOT CHANGED    Details   ALPRAZolam (XANAX) 1 mg tablet Take 1 mg by mouth 3 (three) times a day, Starting Sun 12/12/2021, Historical Med      atorvastatin (LIPITOR) 20 mg tablet Take 20 mg by mouth daily, Starting Sat 12/11/2021, Historical Med      carisoprodol (SOMA) 350 mg tablet Take 350 mg by mouth daily, Starting Sat 12/11/2021, Historical Med      gabapentin (NEURONTIN) 600 MG tablet Take 600 mg by mouth 4 (four) times a day, Starting Wed 12/29/2021, Historical Med      hyoscyamine (LEVSIN/SL) 0.125 mg SL tablet PLACE AND DISSOLVE ONE TABLET UNDER THE TONGUE THREE TIMES A DAY AS NEEDED FOR ABDOMINAL PAIN, Historical Med      QUEtiapine (SEROquel) 25 mg tablet Take 25 mg by mouth 4 (four) times a day, Starting Wed 12/1/2021, Historical Med      traMADol (ULTRAM) 50 mg tablet TAKE 1 TABLET BY MOUTH 3 TIMES A DAY (EARLIEST FILL 10/24/21), Historical Med             ED SEPSIS DOCUMENTATION   Time reflects when diagnosis was documented in both MDM as applicable and the Disposition within this note       Time User Action Codes Description Comment    1/2/2025 10:16 PM Madi Diaz [S42.213A] Humerus surgical neck fracture     1/2/2025 10:18 PM Madi Diaz [S01.01XA] Scalp laceration                  Madi Diaz DO  01/03/25 0854

## 2025-01-05 NOTE — TELEPHONE ENCOUNTER
I called her three times and left a VM. I can see her Monday if she can make it. Can we try her again Monday morning

## 2025-01-06 NOTE — TELEPHONE ENCOUNTER
Caller: Phillip, daughter     Doctor: Devante    Reason for call: She called to rs appt due to the weather (snow) today. She rs'd to 1/8    Call back#: [phone]

## 2025-01-06 NOTE — TELEPHONE ENCOUNTER
Caller: Phillip - patients daughter    Doctor: Devante    Reason for call: Patient is scheduled to see Dr. Low on 1/8/25. She is asking when Dr. Low thinks her surgery will be. She will be taking care of and helping her mother after the surgery and will need to notify her employer.     Call back#: [phone]

## 2025-01-06 NOTE — TELEPHONE ENCOUNTER
I spoke to patient's daughter and told her potentially 1/14.  Daughter will be at Wed 1/8 appt and will clarify at that time.

## 2025-01-06 NOTE — TELEPHONE ENCOUNTER
Caller: patient    Doctor: Devante    Reason for call: patient and her daughter Dotty called to see if there is another doctor in the practice that can get her scheduled for sx earlier than next week.     Call back#: [phone] daughter Dotty

## 2025-01-07 ENCOUNTER — TELEPHONE (OUTPATIENT)
Age: 55
End: 2025-01-07

## 2025-01-07 NOTE — TELEPHONE ENCOUNTER
Caller: Patient    Doctor: Devante    Reason for call:     Patient is asking for an earlier time, the notes are stating the discussions, but she is not asking if the doctor can call the patient today.  She knows about the appointment tomorrow but feels she needs to have the surgery sooner.  Please advise the patient.    Call back#: [phone] Or [phone]

## 2025-01-07 NOTE — TELEPHONE ENCOUNTER
Caller: Sarah     Doctor: Devante    Reason for call: Patient states she is in so much pain and she would like pain medication prescribed to her before her appointment.Patient has an appointment scheduled tomorrow at 9am with Dr. Low. Advised patient the providers usually do not prescribe medication until seen. Please advise.     Call back#: [phone]

## 2025-01-08 ENCOUNTER — PREP FOR PROCEDURE (OUTPATIENT)
Dept: OBGYN CLINIC | Facility: CLINIC | Age: 55
End: 2025-01-08

## 2025-01-08 ENCOUNTER — TELEPHONE (OUTPATIENT)
Age: 55
End: 2025-01-08

## 2025-01-08 ENCOUNTER — APPOINTMENT (OUTPATIENT)
Dept: LAB | Facility: AMBULARY SURGERY CENTER | Age: 55
End: 2025-01-08
Payer: MEDICARE

## 2025-01-08 ENCOUNTER — OFFICE VISIT (OUTPATIENT)
Dept: OBGYN CLINIC | Facility: CLINIC | Age: 55
End: 2025-01-08
Payer: MEDICARE

## 2025-01-08 VITALS — HEIGHT: 64 IN | WEIGHT: 159.6 LBS | BODY MASS INDEX: 27.25 KG/M2

## 2025-01-08 DIAGNOSIS — S42.221A CLOSED 2-PART DISPLACED FRACTURE OF SURGICAL NECK OF RIGHT HUMERUS, INITIAL ENCOUNTER: Primary | ICD-10-CM

## 2025-01-08 DIAGNOSIS — S42.213A HUMERUS SURGICAL NECK FRACTURE: Primary | ICD-10-CM

## 2025-01-08 PROCEDURE — 99214 OFFICE O/P EST MOD 30 MIN: CPT | Performed by: STUDENT IN AN ORGANIZED HEALTH CARE EDUCATION/TRAINING PROGRAM

## 2025-01-08 RX ORDER — CHLORHEXIDINE GLUCONATE ORAL RINSE 1.2 MG/ML
15 SOLUTION DENTAL ONCE
Status: CANCELLED | OUTPATIENT
Start: 2025-01-14 | End: 2025-01-08

## 2025-01-08 RX ORDER — ACETAMINOPHEN 325 MG/1
975 TABLET ORAL ONCE
Status: CANCELLED | OUTPATIENT
Start: 2025-01-14 | End: 2025-01-08

## 2025-01-08 RX ORDER — TRANEXAMIC ACID 10 MG/ML
1000 INJECTION, SOLUTION INTRAVENOUS ONCE
Status: CANCELLED | OUTPATIENT
Start: 2025-01-14 | End: 2025-01-08

## 2025-01-08 RX ORDER — CHLORHEXIDINE GLUCONATE 40 MG/ML
SOLUTION TOPICAL DAILY PRN
Status: CANCELLED | OUTPATIENT
Start: 2025-01-08

## 2025-01-08 RX ORDER — OXYCODONE AND ACETAMINOPHEN 5; 325 MG/1; MG/1
1 TABLET ORAL EVERY 4 HOURS PRN
Qty: 20 TABLET | Refills: 0 | Status: SHIPPED | OUTPATIENT
Start: 2025-01-08 | End: 2025-01-08 | Stop reason: ALTCHOICE

## 2025-01-08 RX ORDER — OXYCODONE HYDROCHLORIDE 5 MG/1
5 TABLET ORAL EVERY 6 HOURS PRN
Qty: 20 TABLET | Refills: 0 | Status: SHIPPED | OUTPATIENT
Start: 2025-01-08 | End: 2025-01-14

## 2025-01-08 RX ORDER — OXYCODONE AND ACETAMINOPHEN 5; 325 MG/1; MG/1
1 TABLET ORAL EVERY 4 HOURS PRN
Qty: 20 TABLET | Refills: 0 | Status: SHIPPED | OUTPATIENT
Start: 2025-01-08 | End: 2025-01-08 | Stop reason: SDUPTHER

## 2025-01-08 RX ORDER — MUPIROCIN 20 MG/G
OINTMENT TOPICAL
Qty: 15 G | Refills: 1 | Status: SHIPPED | OUTPATIENT
Start: 2025-01-08

## 2025-01-08 RX ORDER — GABAPENTIN 100 MG/1
300 CAPSULE ORAL ONCE
Status: CANCELLED | OUTPATIENT
Start: 2025-01-14 | End: 2025-01-08

## 2025-01-08 RX ORDER — CEFAZOLIN SODIUM 2 G/50ML
2000 SOLUTION INTRAVENOUS ONCE
Status: CANCELLED | OUTPATIENT
Start: 2025-01-14 | End: 2025-01-08

## 2025-01-08 NOTE — TELEPHONE ENCOUNTER
PA for oxycodone SUBMITTED to [facility]    via    []CMM-KEY:   [x]Surescripts-Case ID #    []Availity-Auth ID #  NDC #    []Faxed to plan   []Other website     []Phone call Case ID #      [x]PA sent as URGENT    All office notes, labs and other pertaining documents and studies sent. Clinical questions answered. Awaiting determination from insurance company.     Turnaround time for your insurance to make a decision on your Prior Authorization can take 7-21 business days.

## 2025-01-08 NOTE — TELEPHONE ENCOUNTER
Caller: Patient    Doctor: Devante    Reason for call: Patient stated the pharmacy does not have the script. Patient is very upset.    Call back#: [phone]    Sent message to on call Dr Sheth

## 2025-01-08 NOTE — TELEPHONE ENCOUNTER
Caller: Patient    Doctor: Devante    Reason for call: Per the pharmacy they have not received a script for Oxy. Please re-send to [facility]    Call back#: [phone]

## 2025-01-08 NOTE — TELEPHONE ENCOUNTER
Called and spoke with pt and relayed Dr Low's message. She stated understanding, she will call and have them fill it and pay out of pocket for it.

## 2025-01-08 NOTE — TELEPHONE ENCOUNTER
Caller: Sarah    Doctor: Devante    Reason for call: Pharmacy is requiring a Prior Auth for the Oxycodone.    Please advise patient when completed  Thank you    Call back#: [phone]

## 2025-01-08 NOTE — PROGRESS NOTES
Ortho Sports Medicine Shoulder New Patient Visit     Assesment:   54 y.o. female right comminuted proximal humerus fracture, axillary nerve palsy    Plan:    Sarah is present in office for consultation of right humeral surgical neck fracture.  She has a highly comminuted fracture that I feel is high risk for failure with attempted open reduction internal fixation, especially given her history of alcohol abuse and current smoking status.  We discussed the possibility of reverse shoulder arthroplasty as a more reliable treatment option for her.  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  I discussed the possibility of a delayed surgical intervention and an initial nonoperative treatment with monitoring of her axillary nerve.  I explained the delayed reverse shoulder arthroplasty for fracture demonstrates inferior outcomes compared to acute and I feel attempting reverse shoulder arthroplasty later in the setting of a nonunion resulted in inferior outcome.  Plan for surgery next week with sling immobilization postoperatively.  We provided a Percocet refill for her today and I advised significant caution and warned her not to combine this with her baseline medications of tramadol and Xanax.  She can continue with ibuprofen 800 mg to assist with her pain control.  I will see her 5 to 7 days postoperatively.    Given that the patient has failed conservative treatment and continues to have severe pain and/or dysfunction that limits their activities of daily living, they would like to proceed with operative intervention. We discussed with the patient the risks of no treatment, non-operative treatment, and operative treatment. The risks of operative intervention were discussed and include but are not limited to: Infection, bleeding, stiffness, loss of range of motion, blood  "clot, failure of surgery, fracture, delayed union, nonunion, malunion, risk of potential future arthritis, continued problems with swelling, injury to surrounding structures/nerve/artery/vein, recurrence of cysts, failure of hardware, retained hardware and/or foreign body, symptomatic hardware, and continued instability, pain, dysfunction, or disability despite repair.       Conservative treatment:    Ice to shoulder 1-2 times daily, for 20 minutes at a time.  OTC pain medication  Refill of percocet was placed      Imaging:    Imaging from 1/2/25 reviewed by myself and explained to the patient.       Injection:    No Injection planned at this time.      Surgery:     All of the risks and benefits of operative treatment were explained to the patient, as well as the risks and benefits of any alternative treatment options, including nonoperative care. This risks of surgery include, but are not limited to, infection, bleeding, blood clot, damage to nerves/arteries, need for further surgyer, cardiovascular risk, anesthesia risk, and continued pain.  The patient understood this and elects to proceed forward with surgical intervention.  We will proceed forward with a reverse total shoulder replacement and all associated procedures.       Follow up:    No follow-ups on file.        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  Patient states that on 1/2/25 she took prescribed methocarbamol and notes that she went to get up and had\"spaghetti legs\" she fell and hit her face and mostly landed on her right side, she had significant pain since and feels a click and grinding. She states that she has some diminished sensitivity of the right forearm in comparison to her left but denies " tingling of the arm. She was originally seen in the ED when he was placed in a sling, she notes that she has been sleeping in it at night, however, she states that she takes it off during the day due to itchiness but keeps her arm bend across her abdomen. She has been taking percocet prescribed by the ED along with 800 mg of motrin. She states she is a smoker and smokes a 1/2 pack a day. She notes that she has not able to move her right shoulder.       Shoulder Surgical History:  None    Past Medical, Social and Family History:  History reviewed. No pertinent past medical history.  History reviewed. No pertinent surgical history.  Allergies   Allergen Reactions    Demerol [Meperidine] Vomiting    Prednisone Other (See Comments)     Thrush       Current Outpatient Medications on File Prior to Visit   Medication Sig Dispense Refill    ALPRAZolam (XANAX) 1 mg tablet Take 1 mg by mouth 3 (three) times a day      atorvastatin (LIPITOR) 20 mg tablet Take 20 mg by mouth daily      gabapentin (NEURONTIN) 600 MG tablet Take 600 mg by mouth 4 (four) times a day      hyoscyamine (LEVSIN/SL) 0.125 mg SL tablet PLACE AND DISSOLVE ONE TABLET UNDER THE TONGUE THREE TIMES A DAY AS NEEDED FOR ABDOMINAL PAIN      ondansetron (ZOFRAN) 4 mg tablet Take 1 tablet (4 mg total) by mouth every 6 (six) hours 12 tablet 0    QUEtiapine (SEROquel) 25 mg tablet Take 25 mg by mouth 4 (four) times a day      traMADol (ULTRAM) 50 mg tablet TAKE 1 TABLET BY MOUTH 3 TIMES A DAY (EARLIEST FILL 10/24/21)      [DISCONTINUED] oxyCODONE-acetaminophen (Percocet) 5-325 mg per tablet Take 1 tablet by mouth every 4 (four) hours as needed for moderate pain for up to 20 doses Max Daily Amount: 6 tablets 20 tablet 0    carisoprodol (SOMA) 350 mg tablet Take 350 mg by mouth daily (Patient not taking: Reported on 1/8/2025)       No current facility-administered medications on file prior to visit.     Social History     Socioeconomic History    Marital status:  /Civil Union     Spouse name: Not on file    Number of children: Not on file    Years of education: Not on file    Highest education level: Not on file   Occupational History    Not on file   Tobacco Use    Smoking status: Every Day     Current packs/day: 0.50     Types: Cigarettes    Smokeless tobacco: Never   Substance and Sexual Activity    Alcohol use: Never    Drug use: Yes     Types: Marijuana    Sexual activity: Not on file   Other Topics Concern    Not on file   Social History Narrative    Not on file     Social Drivers of Health     Financial Resource Strain: High Risk (3/2/2023)    Received from [facility]    Overall Financial Resource Strain (CARDIA)     Difficulty of Paying Living Expenses: Very hard   Food Insecurity: Food Insecurity Present (3/2/2023)    Received from [facility]    Hunger Vital Sign     Worried About Running Out of Food in the Last Year: Often true     Ran Out of Food in the Last Year: Often true   Transportation Needs: No Transportation Needs (3/2/2023)    Received from [facility]    PRAPARE - Transportation     Lack of Transportation (Medical): No     Lack of Transportation (Non-Medical): No   Physical Activity: Not on file   Stress: Stress Concern Present (3/2/2023)    Received from [facility]    [place] of Occupational Health - Occupational Stress Questionnaire     Feeling of Stress : Very much   Social Connections: Socially Isolated (3/2/2023)    Received from [facility]    Social Connection and Isolation Panel [NHANES]     Frequency of Communication with Friends and Family: Once a week     Frequency of Social Gatherings with Friends and Family: Never     Attends Holiness Services: Never     Active Member of Clubs or Organizations: No     Attends Club or Organization Meetings: Never     Marital Status:    Intimate Partner Violence: Not At Risk (3/2/2023)    Received  "from [facility]    Humiliation, Afraid, Rape, and Kick questionnaire     Fear of Current or Ex-Partner: No     Emotionally Abused: No     Physically Abused: No     Sexually Abused: No   Housing Stability: High Risk (3/2/2023)    Received from [facility]    Housing Stability Vital Sign     Unable to Pay for Housing in the Last Year: Yes     Number of Places Lived in the Last Year: 1     Unstable Housing in the Last Year: No         I have reviewed the past medical, surgical, social and family history, medications and allergies as documented in the EMR.    Review of systems: ROS is negative other than that noted in the HPI.  Constitutional: Negative for fatigue and fever.   HENT: Negative for sore throat.    Respiratory: Negative for shortness of breath.    Cardiovascular: Negative for chest pain.   Gastrointestinal: Negative for abdominal pain.   Endocrine: Negative for cold intolerance and heat intolerance.   Genitourinary: Negative for flank pain.   Musculoskeletal: Negative for back pain.   Skin: Negative for rash.   Allergic/Immunologic: Negative for immunocompromised state.   Neurological: Negative for dizziness.   Psychiatric/Behavioral: Negative for agitation.      Physical Exam:    Height 5' 3.5\" (1.613 m), weight 72.4 kg (159 lb 9.6 oz).    General/Constitutional: NAD, well developed, well nourished  HENT: Normocephalic, atraumatic  CV: Intact distal pulses, regular rate  Resp: No respiratory distress or labored breathing  Lymphatic: No lymphadenopathy palpated  Neuro: Alert and Oriented x 3, no focal deficits  Psych: Normal mood, normal affect, normal judgement, normal behavior  Skin: Warm, dry, no rashes, no erythema      Shoulder focused exam:     Visual inspection of the Right shoulder demonstrates deformity  No open wounds  Anterior and lateral shoulder ecchymosis, swelling of the right forearm and hand  TTP of the lateral and anterior shoulder  Patient lacks " sensation along the lateral shoulder and the axillary nerve region.  She is unable to fire her deltoid  Radial nerve is intact.  Median nerve is intact.  Active and passive ROM not tested due to known fracture  No special tests performed        UE NV Exam: +2 Radial pulses bilaterally  Sensation intact to light touch C5-T1 bilaterally, Radial/median/ulnar nerve motor intact      Bilateral elbow, wrist, and and forearm ROM full, painless with passive ROM, no ttp or crepitance throughout extremities below shoulder joint    Cervical ROM is full without pain, numbness or tingling      Shoulder Imaging    X-rays of the right shoulder and CT of the right upper extremity, which demonstrate comminuted fracture of the proximal humerus with significant varus deformity, comminuted surgical neck fracture, and very short calcar segment on the proximal head component.  I have reviewed the radiology report and agree with their impression.          Scribe Attestation      I,:  Rossy Franco PA-C am acting as a scribe while in the presence of the attending physician.:       I,:  Sid Low DO personally performed the services described in this documentation    as scribed in my presence.:

## 2025-01-08 NOTE — TELEPHONE ENCOUNTER
Caller: Patient    Doctor: Dr. Low    Reason for call: Patient calling stating that Dr. Low was going to call in Oxycodone to the pharmacy.  She received a call from the pharmacy stating that they are unable to fill prescription.   Pharmacy stating there is missing information.  Patient is on way to pharmacy now.    Pharmacy: [facility]  Phone: [phone]    Call back#: [phone]

## 2025-01-08 NOTE — LETTER
January 8, 2025     Madi Diaz DO  [address] [address]    Patient: Sarah Fisher   YOB: 1970   Date of Visit: 1/8/2025       Dear Dr. Diaz:    Thank you for referring Sarah Fisher to me for evaluation. Below are my notes for this consultation.    If you have questions, please do not hesitate to call me. I look forward to following your patient along with you.         Sincerely,        Sid Low DO        CC: No Recipients    Sid Low DO  1/8/2025 10:17 AM  Sign when Signing Visit  Ortho Sports Medicine Shoulder New Patient Visit     Assesment:   54 y.o. female right comminuted proximal humerus fracture, axillary nerve palsy    Plan:    Sarah is present in office for consultation of right humeral surgical neck fracture.  She has a highly comminuted fracture that I feel is high risk for failure with attempted open reduction internal fixation, especially given her history of alcohol abuse and current smoking status.  We discussed the possibility of reverse shoulder arthroplasty as a more reliable treatment option for her.  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  I discussed the possibility of a delayed surgical intervention and an initial nonoperative treatment with monitoring of her axillary nerve.  I explained the delayed reverse shoulder arthroplasty for fracture demonstrates inferior outcomes compared to acute and I feel attempting reverse shoulder arthroplasty later in the setting of a nonunion resulted in inferior outcome.  Plan for surgery next week with sling immobilization postoperatively.  We provided a Percocet refill for her today and I advised significant caution and warned her not to combine this with her baseline medications of tramadol and Xanax.  She can continue with ibuprofen 800 mg to  assist with her pain control.  I will see her 5 to 7 days postoperatively.    Given that the patient has failed conservative treatment and continues to have severe pain and/or dysfunction that limits their activities of daily living, they would like to proceed with operative intervention. We discussed with the patient the risks of no treatment, non-operative treatment, and operative treatment. The risks of operative intervention were discussed and include but are not limited to: Infection, bleeding, stiffness, loss of range of motion, blood clot, failure of surgery, fracture, delayed union, nonunion, malunion, risk of potential future arthritis, continued problems with swelling, injury to surrounding structures/nerve/artery/vein, recurrence of cysts, failure of hardware, retained hardware and/or foreign body, symptomatic hardware, and continued instability, pain, dysfunction, or disability despite repair.       Conservative treatment:    Ice to shoulder 1-2 times daily, for 20 minutes at a time.  OTC pain medication  Refill of percocet was placed      Imaging:    Imaging from 1/2/25 reviewed by myself and explained to the patient.       Injection:    No Injection planned at this time.      Surgery:     All of the risks and benefits of operative treatment were explained to the patient, as well as the risks and benefits of any alternative treatment options, including nonoperative care. This risks of surgery include, but are not limited to, infection, bleeding, blood clot, damage to nerves/arteries, need for further surgyer, cardiovascular risk, anesthesia risk, and continued pain.  The patient understood this and elects to proceed forward with surgical intervention.  We will proceed forward with a reverse total shoulder replacement and all associated procedures.       Follow up:    No follow-ups on file.        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  Patient states that on 1/2/25 she took prescribed methocarbamol and notes that she went to get up and had\"spaghetti legs\" she fell and hit her face and mostly landed on her right side, she had significant pain since and feels a click and grinding. She states that she has some diminished sensitivity of the right forearm in comparison to her left but denies tingling of the arm. She was originally seen in the ED when he was placed in a sling, she notes that she has been sleeping in it at night, however, she states that she takes it off during the day due to itchiness but keeps her arm bend across her abdomen. She has been taking percocet prescribed by the ED along with 800 mg of motrin. She states she is a smoker and smokes a 1/2 pack a day. She notes that she has not able to move her right shoulder.       Shoulder Surgical History:  None    Past Medical, Social and Family History:  History reviewed. No pertinent past medical history.  History reviewed. No pertinent surgical history.  Allergies   Allergen Reactions   • Demerol [Meperidine] Vomiting   • Prednisone Other (See Comments)     Thrush       Current Outpatient Medications on File Prior to Visit   Medication Sig Dispense Refill   • ALPRAZolam (XANAX) 1 mg tablet Take 1 mg by mouth 3 (three) times a day     • atorvastatin (LIPITOR) 20 mg tablet Take 20 mg by mouth daily     • gabapentin (NEURONTIN) 600 MG tablet Take 600 mg by mouth 4 (four) times a day     • hyoscyamine (LEVSIN/SL) 0.125 mg SL tablet PLACE AND DISSOLVE ONE TABLET UNDER THE TONGUE THREE TIMES A DAY AS NEEDED FOR ABDOMINAL PAIN     • ondansetron (ZOFRAN) 4 mg tablet Take 1 tablet (4 mg total) by mouth every 6 (six) hours 12 tablet 0   • QUEtiapine (SEROquel) 25 mg tablet Take 25 mg by mouth 4 (four) times " a day     • traMADol (ULTRAM) 50 mg tablet TAKE 1 TABLET BY MOUTH 3 TIMES A DAY (EARLIEST FILL 10/24/21)     • [DISCONTINUED] oxyCODONE-acetaminophen (Percocet) 5-325 mg per tablet Take 1 tablet by mouth every 4 (four) hours as needed for moderate pain for up to 20 doses Max Daily Amount: 6 tablets 20 tablet 0   • carisoprodol (SOMA) 350 mg tablet Take 350 mg by mouth daily (Patient not taking: Reported on 1/8/2025)       No current facility-administered medications on file prior to visit.     Social History     Socioeconomic History   • Marital status: /Civil Union     Spouse name: Not on file   • Number of children: Not on file   • Years of education: Not on file   • Highest education level: Not on file   Occupational History   • Not on file   Tobacco Use   • Smoking status: Every Day     Current packs/day: 0.50     Types: Cigarettes   • Smokeless tobacco: Never   Substance and Sexual Activity   • Alcohol use: Never   • Drug use: Yes     Types: Marijuana   • Sexual activity: Not on file   Other Topics Concern   • Not on file   Social History Narrative   • Not on file     Social Drivers of Health     Financial Resource Strain: High Risk (3/2/2023)    Received from [facility]    Overall Financial Resource Strain (CARDIA)    • Difficulty of Paying Living Expenses: Very hard   Food Insecurity: Food Insecurity Present (3/2/2023)    Received from [facility]    Hunger Vital Sign    • Worried About Running Out of Food in the Last Year: Often true    • Ran Out of Food in the Last Year: Often true   Transportation Needs: No Transportation Needs (3/2/2023)    Received from [facility]    PRAPARE - Transportation    • Lack of Transportation (Medical): No    • Lack of Transportation (Non-Medical): No   Physical Activity: Not on file   Stress: Stress Concern Present (3/2/2023)    Received from [facility]    Citizen of [place] of Occupational Health -  "Occupational Stress Questionnaire    • Feeling of Stress : Very much   Social Connections: Socially Isolated (3/2/2023)    Received from [facility]    Social Connection and Isolation Panel [NHANES]    • Frequency of Communication with Friends and Family: Once a week    • Frequency of Social Gatherings with Friends and Family: Never    • Attends Restorationism Services: Never    • Active Member of Clubs or Organizations: No    • Attends Club or Organization Meetings: Never    • Marital Status:    Intimate Partner Violence: Not At Risk (3/2/2023)    Received from [facility]    Humiliation, Afraid, Rape, and Kick questionnaire    • Fear of Current or Ex-Partner: No    • Emotionally Abused: No    • Physically Abused: No    • Sexually Abused: No   Housing Stability: High Risk (3/2/2023)    Received from [facility]    Housing Stability Vital Sign    • Unable to Pay for Housing in the Last Year: Yes    • Number of Places Lived in the Last Year: 1    • Unstable Housing in the Last Year: No         I have reviewed the past medical, surgical, social and family history, medications and allergies as documented in the EMR.    Review of systems: ROS is negative other than that noted in the HPI.  Constitutional: Negative for fatigue and fever.   HENT: Negative for sore throat.    Respiratory: Negative for shortness of breath.    Cardiovascular: Negative for chest pain.   Gastrointestinal: Negative for abdominal pain.   Endocrine: Negative for cold intolerance and heat intolerance.   Genitourinary: Negative for flank pain.   Musculoskeletal: Negative for back pain.   Skin: Negative for rash.   Allergic/Immunologic: Negative for immunocompromised state.   Neurological: Negative for dizziness.   Psychiatric/Behavioral: Negative for agitation.      Physical Exam:    Height 5' 3.5\" (1.613 m), weight 72.4 kg (159 lb 9.6 oz).    General/Constitutional: NAD, well developed, well " nourished  HENT: Normocephalic, atraumatic  CV: Intact distal pulses, regular rate  Resp: No respiratory distress or labored breathing  Lymphatic: No lymphadenopathy palpated  Neuro: Alert and Oriented x 3, no focal deficits  Psych: Normal mood, normal affect, normal judgement, normal behavior  Skin: Warm, dry, no rashes, no erythema      Shoulder focused exam:     Visual inspection of the Right shoulder demonstrates deformity  No open wounds  Anterior and lateral shoulder ecchymosis, swelling of the right forearm and hand  TTP of the lateral and anterior shoulder  Patient lacks sensation along the lateral shoulder and the axillary nerve region.  She is unable to fire her deltoid  Radial nerve is intact.  Median nerve is intact.  Active and passive ROM not tested due to known fracture  No special tests performed        UE NV Exam: +2 Radial pulses bilaterally  Sensation intact to light touch C5-T1 bilaterally, Radial/median/ulnar nerve motor intact      Bilateral elbow, wrist, and and forearm ROM full, painless with passive ROM, no ttp or crepitance throughout extremities below shoulder joint    Cervical ROM is full without pain, numbness or tingling      Shoulder Imaging    X-rays of the right shoulder and CT of the right upper extremity, which demonstrate comminuted fracture of the proximal humerus with significant varus deformity, comminuted surgical neck fracture, and very short calcar segment on the proximal head component.  I have reviewed the radiology report and agree with their impression.          Scribe Attestation      I,:  Rossy Franco PA-C am acting as a scribe while in the presence of the attending physician.:       I,:  Sid Low DO personally performed the services described in this documentation    as scribed in my presence.:

## 2025-01-08 NOTE — TELEPHONE ENCOUNTER
Caller: Sarah    Doctor: Devante Daniel    Reason for call: Patient calling again concerning her pain medication. Would like this done today. I advised we are doing our best, our authorization team is working on this.  Patient would like a call once authorized. I asked if she needed me to ask what she can take in the meantime she said she was told tylenol and advil.  I explained I would leave another message for her.     Call back#: [phone]

## 2025-01-08 NOTE — H&P (VIEW-ONLY)
Ortho Sports Medicine Shoulder New Patient Visit     Assesment:   54 y.o. female right comminuted proximal humerus fracture, axillary nerve palsy    Plan:    Sarah is present in office for consultation of right humeral surgical neck fracture.  She has a highly comminuted fracture that I feel is high risk for failure with attempted open reduction internal fixation, especially given her history of alcohol abuse and current smoking status.  We discussed the possibility of reverse shoulder arthroplasty as a more reliable treatment option for her.  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  I discussed the possibility of a delayed surgical intervention and an initial nonoperative treatment with monitoring of her axillary nerve.  I explained the delayed reverse shoulder arthroplasty for fracture demonstrates inferior outcomes compared to acute and I feel attempting reverse shoulder arthroplasty later in the setting of a nonunion resulted in inferior outcome.  Plan for surgery next week with sling immobilization postoperatively.  We provided a Percocet refill for her today and I advised significant caution and warned her not to combine this with her baseline medications of tramadol and Xanax.  She can continue with ibuprofen 800 mg to assist with her pain control.  I will see her 5 to 7 days postoperatively.    Given that the patient has failed conservative treatment and continues to have severe pain and/or dysfunction that limits their activities of daily living, they would like to proceed with operative intervention. We discussed with the patient the risks of no treatment, non-operative treatment, and operative treatment. The risks of operative intervention were discussed and include but are not limited to: Infection, bleeding, stiffness, loss of range of motion, blood  "clot, failure of surgery, fracture, delayed union, nonunion, malunion, risk of potential future arthritis, continued problems with swelling, injury to surrounding structures/nerve/artery/vein, recurrence of cysts, failure of hardware, retained hardware and/or foreign body, symptomatic hardware, and continued instability, pain, dysfunction, or disability despite repair.       Conservative treatment:    Ice to shoulder 1-2 times daily, for 20 minutes at a time.  OTC pain medication  Refill of percocet was placed      Imaging:    Imaging from 1/2/25 reviewed by myself and explained to the patient.       Injection:    No Injection planned at this time.      Surgery:     All of the risks and benefits of operative treatment were explained to the patient, as well as the risks and benefits of any alternative treatment options, including nonoperative care. This risks of surgery include, but are not limited to, infection, bleeding, blood clot, damage to nerves/arteries, need for further surgyer, cardiovascular risk, anesthesia risk, and continued pain.  The patient understood this and elects to proceed forward with surgical intervention.  We will proceed forward with a reverse total shoulder replacement and all associated procedures.       Follow up:    No follow-ups on file.        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  Patient states that on 1/2/25 she took prescribed methocarbamol and notes that she went to get up and had\"spaghetti legs\" she fell and hit her face and mostly landed on her right side, she had significant pain since and feels a click and grinding. She states that she has some diminished sensitivity of the right forearm in comparison to her left but denies " tingling of the arm. She was originally seen in the ED when he was placed in a sling, she notes that she has been sleeping in it at night, however, she states that she takes it off during the day due to itchiness but keeps her arm bend across her abdomen. She has been taking percocet prescribed by the ED along with 800 mg of motrin. She states she is a smoker and smokes a 1/2 pack a day. She notes that she has not able to move her right shoulder.       Shoulder Surgical History:  None    Past Medical, Social and Family History:  History reviewed. No pertinent past medical history.  History reviewed. No pertinent surgical history.  Allergies   Allergen Reactions    Demerol [Meperidine] Vomiting    Prednisone Other (See Comments)     Thrush       Current Outpatient Medications on File Prior to Visit   Medication Sig Dispense Refill    ALPRAZolam (XANAX) 1 mg tablet Take 1 mg by mouth 3 (three) times a day      atorvastatin (LIPITOR) 20 mg tablet Take 20 mg by mouth daily      gabapentin (NEURONTIN) 600 MG tablet Take 600 mg by mouth 4 (four) times a day      hyoscyamine (LEVSIN/SL) 0.125 mg SL tablet PLACE AND DISSOLVE ONE TABLET UNDER THE TONGUE THREE TIMES A DAY AS NEEDED FOR ABDOMINAL PAIN      ondansetron (ZOFRAN) 4 mg tablet Take 1 tablet (4 mg total) by mouth every 6 (six) hours 12 tablet 0    QUEtiapine (SEROquel) 25 mg tablet Take 25 mg by mouth 4 (four) times a day      traMADol (ULTRAM) 50 mg tablet TAKE 1 TABLET BY MOUTH 3 TIMES A DAY (EARLIEST FILL 10/24/21)      [DISCONTINUED] oxyCODONE-acetaminophen (Percocet) 5-325 mg per tablet Take 1 tablet by mouth every 4 (four) hours as needed for moderate pain for up to 20 doses Max Daily Amount: 6 tablets 20 tablet 0    carisoprodol (SOMA) 350 mg tablet Take 350 mg by mouth daily (Patient not taking: Reported on 1/8/2025)       No current facility-administered medications on file prior to visit.     Social History     Socioeconomic History    Marital status:  /Civil Union     Spouse name: Not on file    Number of children: Not on file    Years of education: Not on file    Highest education level: Not on file   Occupational History    Not on file   Tobacco Use    Smoking status: Every Day     Current packs/day: 0.50     Types: Cigarettes    Smokeless tobacco: Never   Substance and Sexual Activity    Alcohol use: Never    Drug use: Yes     Types: Marijuana    Sexual activity: Not on file   Other Topics Concern    Not on file   Social History Narrative    Not on file     Social Drivers of Health     Financial Resource Strain: High Risk (3/2/2023)    Received from [facility]    Overall Financial Resource Strain (CARDIA)     Difficulty of Paying Living Expenses: Very hard   Food Insecurity: Food Insecurity Present (3/2/2023)    Received from [facility]    Hunger Vital Sign     Worried About Running Out of Food in the Last Year: Often true     Ran Out of Food in the Last Year: Often true   Transportation Needs: No Transportation Needs (3/2/2023)    Received from [facility]    PRAPARE - Transportation     Lack of Transportation (Medical): No     Lack of Transportation (Non-Medical): No   Physical Activity: Not on file   Stress: Stress Concern Present (3/2/2023)    Received from [facility]    [employer] - Occupational Stress Questionnaire     Feeling of Stress : Very much   Social Connections: Socially Isolated (3/2/2023)    Received from [facility]    Social Connection and Isolation Panel [NHANES]     Frequency of Communication with Friends and Family: Once a week     Frequency of Social Gatherings with Friends and Family: Never     Attends Anabaptist Services: Never     Active Member of Clubs or Organizations: No     Attends Club or Organization Meetings: Never     Marital Status:    Intimate Partner Violence: Not At Risk (3/2/2023)    Received  "from [facility]    Humiliation, Afraid, Rape, and Kick questionnaire     Fear of Current or Ex-Partner: No     Emotionally Abused: No     Physically Abused: No     Sexually Abused: No   Housing Stability: High Risk (3/2/2023)    Received from [facility]    Housing Stability Vital Sign     Unable to Pay for Housing in the Last Year: Yes     Number of Places Lived in the Last Year: 1     Unstable Housing in the Last Year: No         I have reviewed the past medical, surgical, social and family history, medications and allergies as documented in the EMR.    Review of systems: ROS is negative other than that noted in the HPI.  Constitutional: Negative for fatigue and fever.   HENT: Negative for sore throat.    Respiratory: Negative for shortness of breath.    Cardiovascular: Negative for chest pain.   Gastrointestinal: Negative for abdominal pain.   Endocrine: Negative for cold intolerance and heat intolerance.   Genitourinary: Negative for flank pain.   Musculoskeletal: Negative for back pain.   Skin: Negative for rash.   Allergic/Immunologic: Negative for immunocompromised state.   Neurological: Negative for dizziness.   Psychiatric/Behavioral: Negative for agitation.      Physical Exam:    Height 5' 3.5\" (1.613 m), weight 72.4 kg (159 lb 9.6 oz).    General/Constitutional: NAD, well developed, well nourished  HENT: Normocephalic, atraumatic  CV: Intact distal pulses, regular rate  Resp: No respiratory distress or labored breathing  Lymphatic: No lymphadenopathy palpated  Neuro: Alert and Oriented x 3, no focal deficits  Psych: Normal mood, normal affect, normal judgement, normal behavior  Skin: Warm, dry, no rashes, no erythema      Shoulder focused exam:     Visual inspection of the Right shoulder demonstrates deformity  No open wounds  Anterior and lateral shoulder ecchymosis, swelling of the right forearm and hand  TTP of the lateral and anterior shoulder  Patient lacks " sensation along the lateral shoulder and the axillary nerve region.  She is unable to fire her deltoid  Radial nerve is intact.  Median nerve is intact.  Active and passive ROM not tested due to known fracture  No special tests performed        UE NV Exam: +2 Radial pulses bilaterally  Sensation intact to light touch C5-T1 bilaterally, Radial/median/ulnar nerve motor intact      Bilateral elbow, wrist, and and forearm ROM full, painless with passive ROM, no ttp or crepitance throughout extremities below shoulder joint    Cervical ROM is full without pain, numbness or tingling      Shoulder Imaging    X-rays of the right shoulder and CT of the right upper extremity, which demonstrate comminuted fracture of the proximal humerus with significant varus deformity, comminuted surgical neck fracture, and very short calcar segment on the proximal head component.  I have reviewed the radiology report and agree with their impression.          Scribe Attestation      I,:  Rsosy Franco PA-C am acting as a scribe while in the presence of the attending physician.:       I,:  Sid Low DO personally performed the services described in this documentation    as scribed in my presence.:

## 2025-01-10 ENCOUNTER — TELEPHONE (OUTPATIENT)
Age: 55
End: 2025-01-10

## 2025-01-10 NOTE — TELEPHONE ENCOUNTER
PA for OXY 5 MG  APPROVED     Date(s) approved UNTIL 07/10/2025        Patient advised by          []MyChart Message  []Phone call   [x]LMOM  []L/M to call office as no active Communication consent on file  []Unable to leave detailed message as VM not approved on Communication consent       Pharmacy advised by    [x]Fax  []Phone call    Approval letter scanned into Media Yes

## 2025-01-11 ENCOUNTER — ANESTHESIA EVENT (OUTPATIENT)
Dept: PERIOP | Facility: HOSPITAL | Age: 55
End: 2025-01-11
Payer: MEDICARE

## 2025-01-11 NOTE — ANESTHESIA PREPROCEDURE EVALUATION
"Procedure:  ARTHROPLASTY SHOULDER REVERSE (Right: Shoulder)    Relevant Problems   No relevant active problems      on 1/2/25 she took prescribed methocarbamol and notes that she went to get up and had\"spaghetti legs\" she fell and hit her face and mostly landed on her right side, she had significant pain since and feels a click and grinding. She states that she has some diminished sensitivity of the right forearm in comparison to her left but denies tingling of the arm     Polypharmacy for pain/anxiety/muscle relaxants: xanax TID scheduled, gabapentin 600 QID scheduled, seroquel QID scheduled, tramadol TID, ibuprofen, soma 350 QD, robaxin, percocet    Current smoker: 1/2 pk/day, and marijuana use    Physical Exam    Airway    Mallampati score: III  TM Distance: >3 FB  Neck ROM: full     Dental   No notable dental hx     Cardiovascular      Pulmonary      Other Findings  post-pubertal.  Patient admits to taking alprazolam and oxycodone last night. Smoked marijuana last night. No meds this AM    Anesthesia Plan  ASA Score- 3     Anesthesia Type- general with ASA Monitors.         Additional Monitors:     Airway Plan: ETT.    Comment: GA with ETT, IV, antiemetics  Right interscalene nerve block for postop pain control as requested by surgeon.       Plan Factors-    Chart reviewed.   Existing labs reviewed. Patient summary reviewed.    Patient is not a current smoker.              Induction- intravenous.    Postoperative Plan- . Planned trial extubation    Perioperative Resuscitation Plan - Level 1 - Full Code.       Informed Consent- Anesthetic plan and risks discussed with patient.  I personally reviewed this patient with the CRNA. Discussed and agreed on the Anesthesia Plan with the CRNA..        "

## 2025-01-13 ENCOUNTER — TELEPHONE (OUTPATIENT)
Dept: OBGYN CLINIC | Facility: CLINIC | Age: 55
End: 2025-01-13

## 2025-01-13 ENCOUNTER — APPOINTMENT (OUTPATIENT)
Dept: LAB | Facility: HOSPITAL | Age: 55
End: 2025-01-13
Payer: MEDICARE

## 2025-01-13 ENCOUNTER — LAB REQUISITION (OUTPATIENT)
Dept: LAB | Facility: HOSPITAL | Age: 55
End: 2025-01-13
Payer: MEDICARE

## 2025-01-13 DIAGNOSIS — S42.213A HUMERUS SURGICAL NECK FRACTURE: ICD-10-CM

## 2025-01-13 DIAGNOSIS — S42.213A UNSPECIFIED DISPLACED FRACTURE OF SURGICAL NECK OF UNSPECIFIED HUMERUS, INITIAL ENCOUNTER FOR CLOSED FRACTURE: ICD-10-CM

## 2025-01-13 LAB
ABO GROUP BLD: NORMAL
BASOPHILS # BLD AUTO: 0.03 THOUSANDS/ΜL (ref 0–0.1)
BASOPHILS NFR BLD AUTO: 0 % (ref 0–1)
BLD GP AB SCN SERPL QL: NEGATIVE
EOSINOPHIL # BLD AUTO: 0.19 THOUSAND/ΜL (ref 0–0.61)
EOSINOPHIL NFR BLD AUTO: 2 % (ref 0–6)
ERYTHROCYTE [DISTWIDTH] IN BLOOD BY AUTOMATED COUNT: 14.6 % (ref 11.6–15.1)
EST. AVERAGE GLUCOSE BLD GHB EST-MCNC: 120 MG/DL
HBA1C MFR BLD: 5.8 %
HCT VFR BLD AUTO: 37.7 % (ref 34.8–46.1)
HGB BLD-MCNC: 12.2 G/DL (ref 11.5–15.4)
IMM GRANULOCYTES # BLD AUTO: 0.07 THOUSAND/UL (ref 0–0.2)
IMM GRANULOCYTES NFR BLD AUTO: 1 % (ref 0–2)
INR PPP: 0.97 (ref 0.85–1.19)
LYMPHOCYTES # BLD AUTO: 2.14 THOUSANDS/ΜL (ref 0.6–4.47)
LYMPHOCYTES NFR BLD AUTO: 21 % (ref 14–44)
MCH RBC QN AUTO: 29.8 PG (ref 26.8–34.3)
MCHC RBC AUTO-ENTMCNC: 32.4 G/DL (ref 31.4–37.4)
MCV RBC AUTO: 92 FL (ref 82–98)
MONOCYTES # BLD AUTO: 0.71 THOUSAND/ΜL (ref 0.17–1.22)
MONOCYTES NFR BLD AUTO: 7 % (ref 4–12)
NEUTROPHILS # BLD AUTO: 7.26 THOUSANDS/ΜL (ref 1.85–7.62)
NEUTS SEG NFR BLD AUTO: 69 % (ref 43–75)
NRBC BLD AUTO-RTO: 0 /100 WBCS
PLATELET # BLD AUTO: 446 THOUSANDS/UL (ref 149–390)
PMV BLD AUTO: 9.4 FL (ref 8.9–12.7)
PROTHROMBIN TIME: 13.4 SECONDS (ref 12.3–15)
RBC # BLD AUTO: 4.09 MILLION/UL (ref 3.81–5.12)
RH BLD: NEGATIVE
SPECIMEN EXPIRATION DATE: NORMAL
WBC # BLD AUTO: 10.4 THOUSAND/UL (ref 4.31–10.16)

## 2025-01-13 PROCEDURE — 36415 COLL VENOUS BLD VENIPUNCTURE: CPT

## 2025-01-13 PROCEDURE — 85025 COMPLETE CBC W/AUTO DIFF WBC: CPT

## 2025-01-13 PROCEDURE — 86900 BLOOD TYPING SEROLOGIC ABO: CPT | Performed by: STUDENT IN AN ORGANIZED HEALTH CARE EDUCATION/TRAINING PROGRAM

## 2025-01-13 PROCEDURE — 86901 BLOOD TYPING SEROLOGIC RH(D): CPT | Performed by: STUDENT IN AN ORGANIZED HEALTH CARE EDUCATION/TRAINING PROGRAM

## 2025-01-13 PROCEDURE — 85610 PROTHROMBIN TIME: CPT

## 2025-01-13 PROCEDURE — 86850 RBC ANTIBODY SCREEN: CPT | Performed by: STUDENT IN AN ORGANIZED HEALTH CARE EDUCATION/TRAINING PROGRAM

## 2025-01-13 NOTE — TELEPHONE ENCOUNTER
Caller: pt     Doctor: Devante     Reason for call: pt states she did not get bloodwork done yet and will be going today. Please advise if that is okay. [facility]       Call back#: [phone]

## 2025-01-13 NOTE — TELEPHONE ENCOUNTER
Caller: patient    Doctor: marcelino    Reason for call: patient recently got blood work done, phlebotomist told patient to give a call to ordering provider to make the order STAT    Call back#: [phone]

## 2025-01-14 ENCOUNTER — HOSPITAL ENCOUNTER (OUTPATIENT)
Facility: HOSPITAL | Age: 55
Setting detail: OUTPATIENT SURGERY
Discharge: HOME | End: 2025-01-14
Attending: STUDENT IN AN ORGANIZED HEALTH CARE EDUCATION/TRAINING PROGRAM | Admitting: STUDENT IN AN ORGANIZED HEALTH CARE EDUCATION/TRAINING PROGRAM
Payer: MEDICARE

## 2025-01-14 ENCOUNTER — ANESTHESIA (OUTPATIENT)
Dept: PERIOP | Facility: HOSPITAL | Age: 55
End: 2025-01-14
Payer: MEDICARE

## 2025-01-14 ENCOUNTER — APPOINTMENT (OUTPATIENT)
Dept: RADIOLOGY | Facility: HOSPITAL | Age: 55
End: 2025-01-14
Payer: MEDICARE

## 2025-01-14 VITALS
HEART RATE: 92 BPM | DIASTOLIC BLOOD PRESSURE: 66 MMHG | RESPIRATION RATE: 18 BRPM | SYSTOLIC BLOOD PRESSURE: 127 MMHG | TEMPERATURE: 97.3 F | WEIGHT: 155.4 LBS | OXYGEN SATURATION: 92 % | BODY MASS INDEX: 27.1 KG/M2

## 2025-01-14 DIAGNOSIS — S42.211A CLOSED DISPLACED FRACTURE OF SURGICAL NECK OF RIGHT HUMERUS, UNSPECIFIED FRACTURE MORPHOLOGY, INITIAL ENCOUNTER: Primary | ICD-10-CM

## 2025-01-14 LAB
ABO GROUP BLD: NORMAL
RH BLD: NEGATIVE

## 2025-01-14 PROCEDURE — C1713 ANCHOR/SCREW BN/BN,TIS/BN: HCPCS | Performed by: STUDENT IN AN ORGANIZED HEALTH CARE EDUCATION/TRAINING PROGRAM

## 2025-01-14 PROCEDURE — 23472 RECONSTRUCT SHOULDER JOINT: CPT

## 2025-01-14 PROCEDURE — C1776 JOINT DEVICE (IMPLANTABLE): HCPCS | Performed by: STUDENT IN AN ORGANIZED HEALTH CARE EDUCATION/TRAINING PROGRAM

## 2025-01-14 PROCEDURE — 23472 RECONSTRUCT SHOULDER JOINT: CPT | Performed by: STUDENT IN AN ORGANIZED HEALTH CARE EDUCATION/TRAINING PROGRAM

## 2025-01-14 PROCEDURE — 73020 X-RAY EXAM OF SHOULDER: CPT

## 2025-01-14 DEVICE — 30MM CENTRAL SCREW, MODULAR
Type: IMPLANTABLE DEVICE | Site: SHOULDER | Status: FUNCTIONAL
Brand: ARTHREX®

## 2025-01-14 DEVICE — 5.5X20MM PERIPHERAL SCREW, LOCKING
Type: IMPLANTABLE DEVICE | Site: SHOULDER | Status: FUNCTIONAL
Brand: ARTHREX®

## 2025-01-14 DEVICE — COMBINATION HUMERAL INSERT 33+3 / 36
Type: IMPLANTABLE DEVICE | Site: SHOULDER | Status: FUNCTIONAL
Brand: ARTHREX®

## 2025-01-14 DEVICE — 4.5X36MM PERIPHERAL SCREW, NON-LOCKING
Type: IMPLANTABLE DEVICE | Site: SHOULDER | Status: FUNCTIONAL
Brand: ARTHREX®

## 2025-01-14 DEVICE — 24MM +2 LAT BASEPLATE, MODULAR
Type: IMPLANTABLE DEVICE | Site: SHOULDER | Status: FUNCTIONAL
Brand: ARTHREX®

## 2025-01-14 DEVICE — UNIVERS REVERS SUTURE CUP, 33 (NEUTRAL)
Type: IMPLANTABLE DEVICE | Site: SHOULDER | Status: FUNCTIONAL
Brand: ARTHREX®

## 2025-01-14 DEVICE — 36/24 GLENOSPHERE
Type: IMPLANTABLE DEVICE | Site: SHOULDER | Status: FUNCTIONAL
Brand: ARTHREX®

## 2025-01-14 DEVICE — 5.5X24MM PERIPHERAL SCREW, LOCKING
Type: IMPLANTABLE DEVICE | Site: SHOULDER | Status: FUNCTIONAL
Brand: ARTHREX®

## 2025-01-14 DEVICE — FXBRIDGE TUBEROSITY REPAIR SYSTEM
Type: IMPLANTABLE DEVICE | Site: SHOULDER | Status: FUNCTIONAL
Brand: ARTHREX®

## 2025-01-14 DEVICE — UNIVERS REVERS HUMERAL STEM, SIZE 8
Type: IMPLANTABLE DEVICE | Site: SHOULDER | Status: FUNCTIONAL
Brand: ARTHREX®

## 2025-01-14 RX ORDER — CHLORHEXIDINE GLUCONATE ORAL RINSE 1.2 MG/ML
15 SOLUTION DENTAL ONCE
Status: COMPLETED | OUTPATIENT
Start: 2025-01-14 | End: 2025-01-14

## 2025-01-14 RX ORDER — BUPIVACAINE HYDROCHLORIDE 5 MG/ML
INJECTION, SOLUTION EPIDURAL; INTRACAUDAL
Status: COMPLETED | OUTPATIENT
Start: 2025-01-14 | End: 2025-01-14

## 2025-01-14 RX ORDER — ONDANSETRON 2 MG/ML
INJECTION INTRAMUSCULAR; INTRAVENOUS AS NEEDED
Status: DISCONTINUED | OUTPATIENT
Start: 2025-01-14 | End: 2025-01-14

## 2025-01-14 RX ORDER — ONDANSETRON 4 MG/1
4 TABLET, FILM COATED ORAL EVERY 8 HOURS PRN
Qty: 15 TABLET | Refills: 0 | Status: SHIPPED | OUTPATIENT
Start: 2025-01-14

## 2025-01-14 RX ORDER — OXYCODONE HYDROCHLORIDE 5 MG/1
5 TABLET ORAL EVERY 4 HOURS PRN
Qty: 12 TABLET | Refills: 0 | Status: CANCELLED | OUTPATIENT
Start: 2025-01-14

## 2025-01-14 RX ORDER — ACETAMINOPHEN 325 MG/1
650 TABLET ORAL EVERY 6 HOURS PRN
Status: DISCONTINUED | OUTPATIENT
Start: 2025-01-14 | End: 2025-01-14 | Stop reason: HOSPADM

## 2025-01-14 RX ORDER — SODIUM CHLORIDE, SODIUM LACTATE, POTASSIUM CHLORIDE, CALCIUM CHLORIDE 600; 310; 30; 20 MG/100ML; MG/100ML; MG/100ML; MG/100ML
INJECTION, SOLUTION INTRAVENOUS CONTINUOUS PRN
Status: DISCONTINUED | OUTPATIENT
Start: 2025-01-14 | End: 2025-01-14

## 2025-01-14 RX ORDER — VANCOMYCIN HYDROCHLORIDE 1 G/20ML
INJECTION, POWDER, LYOPHILIZED, FOR SOLUTION INTRAVENOUS AS NEEDED
Status: DISCONTINUED | OUTPATIENT
Start: 2025-01-14 | End: 2025-01-14 | Stop reason: HOSPADM

## 2025-01-14 RX ORDER — OXYCODONE HYDROCHLORIDE 5 MG/1
5 TABLET ORAL EVERY 4 HOURS PRN
Qty: 12 TABLET | Refills: 0 | Status: SHIPPED | OUTPATIENT
Start: 2025-01-14 | End: 2025-01-16 | Stop reason: SDUPTHER

## 2025-01-14 RX ORDER — CEFADROXIL 500 MG/1
500 CAPSULE ORAL EVERY 12 HOURS SCHEDULED
Qty: 10 CAPSULE | Refills: 0 | Status: SHIPPED | OUTPATIENT
Start: 2025-01-14 | End: 2025-01-19

## 2025-01-14 RX ORDER — ONDANSETRON 2 MG/ML
4 INJECTION INTRAMUSCULAR; INTRAVENOUS EVERY 6 HOURS PRN
Status: DISCONTINUED | OUTPATIENT
Start: 2025-01-14 | End: 2025-01-14 | Stop reason: HOSPADM

## 2025-01-14 RX ORDER — CHLORHEXIDINE GLUCONATE 40 MG/ML
SOLUTION TOPICAL DAILY PRN
Status: DISCONTINUED | OUTPATIENT
Start: 2025-01-14 | End: 2025-01-14 | Stop reason: HOSPADM

## 2025-01-14 RX ORDER — ROCURONIUM BROMIDE 10 MG/ML
INJECTION, SOLUTION INTRAVENOUS AS NEEDED
Status: DISCONTINUED | OUTPATIENT
Start: 2025-01-14 | End: 2025-01-14

## 2025-01-14 RX ORDER — NAPROXEN 500 MG/1
500 TABLET ORAL 2 TIMES DAILY WITH MEALS
Qty: 28 TABLET | Refills: 0 | Status: SHIPPED | OUTPATIENT
Start: 2025-01-14 | End: 2025-01-28

## 2025-01-14 RX ORDER — FENTANYL CITRATE/PF 50 MCG/ML
25 SYRINGE (ML) INJECTION
Status: DISCONTINUED | OUTPATIENT
Start: 2025-01-14 | End: 2025-01-14 | Stop reason: HOSPADM

## 2025-01-14 RX ORDER — CEFAZOLIN SODIUM 2 G/50ML
2000 SOLUTION INTRAVENOUS ONCE
Status: COMPLETED | OUTPATIENT
Start: 2025-01-14 | End: 2025-01-14

## 2025-01-14 RX ORDER — HYDROMORPHONE HCL/PF 1 MG/ML
0.5 SYRINGE (ML) INJECTION
Status: DISCONTINUED | OUTPATIENT
Start: 2025-01-14 | End: 2025-01-14 | Stop reason: HOSPADM

## 2025-01-14 RX ORDER — PHENYLEPHRINE HCL IN 0.9% NACL 1 MG/10 ML
SYRINGE (ML) INTRAVENOUS AS NEEDED
Status: DISCONTINUED | OUTPATIENT
Start: 2025-01-14 | End: 2025-01-14

## 2025-01-14 RX ORDER — OXYCODONE HYDROCHLORIDE 5 MG/1
10 TABLET ORAL EVERY 4 HOURS PRN
Status: DISCONTINUED | OUTPATIENT
Start: 2025-01-14 | End: 2025-01-14 | Stop reason: HOSPADM

## 2025-01-14 RX ORDER — ACETAMINOPHEN 500 MG
1000 TABLET ORAL EVERY 6 HOURS PRN
Qty: 56 TABLET | Refills: 0 | Status: SHIPPED | OUTPATIENT
Start: 2025-01-14 | End: 2025-01-28

## 2025-01-14 RX ORDER — FENTANYL CITRATE 50 UG/ML
INJECTION, SOLUTION INTRAMUSCULAR; INTRAVENOUS
Status: COMPLETED | OUTPATIENT
Start: 2025-01-14 | End: 2025-01-14

## 2025-01-14 RX ORDER — SCOPOLAMINE 1 MG/3D
1 PATCH, EXTENDED RELEASE TRANSDERMAL
Status: DISCONTINUED | OUTPATIENT
Start: 2025-01-14 | End: 2025-01-14 | Stop reason: HOSPADM

## 2025-01-14 RX ORDER — OXYCODONE HYDROCHLORIDE 5 MG/1
5 TABLET ORAL EVERY 4 HOURS PRN
Status: DISCONTINUED | OUTPATIENT
Start: 2025-01-14 | End: 2025-01-14 | Stop reason: HOSPADM

## 2025-01-14 RX ORDER — TRANEXAMIC ACID 10 MG/ML
1000 INJECTION, SOLUTION INTRAVENOUS ONCE
Status: COMPLETED | OUTPATIENT
Start: 2025-01-14 | End: 2025-01-14

## 2025-01-14 RX ORDER — PROPOFOL 10 MG/ML
INJECTION, EMULSION INTRAVENOUS AS NEEDED
Status: DISCONTINUED | OUTPATIENT
Start: 2025-01-14 | End: 2025-01-14

## 2025-01-14 RX ORDER — LIDOCAINE HYDROCHLORIDE 10 MG/ML
INJECTION, SOLUTION EPIDURAL; INFILTRATION; INTRACAUDAL; PERINEURAL AS NEEDED
Status: DISCONTINUED | OUTPATIENT
Start: 2025-01-14 | End: 2025-01-14

## 2025-01-14 RX ORDER — ASPIRIN 81 MG/1
81 TABLET, CHEWABLE ORAL 2 TIMES DAILY
Qty: 60 TABLET | Refills: 0 | Status: SHIPPED | OUTPATIENT
Start: 2025-01-14 | End: 2025-02-13

## 2025-01-14 RX ORDER — EPHEDRINE SULFATE 50 MG/ML
INJECTION INTRAVENOUS AS NEEDED
Status: DISCONTINUED | OUTPATIENT
Start: 2025-01-14 | End: 2025-01-14

## 2025-01-14 RX ORDER — DEXAMETHASONE SODIUM PHOSPHATE 10 MG/ML
INJECTION, SOLUTION INTRAMUSCULAR; INTRAVENOUS AS NEEDED
Status: DISCONTINUED | OUTPATIENT
Start: 2025-01-14 | End: 2025-01-14

## 2025-01-14 RX ORDER — ACETAMINOPHEN 325 MG/1
975 TABLET ORAL ONCE
Status: COMPLETED | OUTPATIENT
Start: 2025-01-14 | End: 2025-01-14

## 2025-01-14 RX ORDER — MIDAZOLAM HYDROCHLORIDE 2 MG/2ML
INJECTION, SOLUTION INTRAMUSCULAR; INTRAVENOUS
Status: COMPLETED | OUTPATIENT
Start: 2025-01-14 | End: 2025-01-14

## 2025-01-14 RX ORDER — GABAPENTIN 100 MG/1
300 CAPSULE ORAL ONCE
Status: COMPLETED | OUTPATIENT
Start: 2025-01-14 | End: 2025-01-14

## 2025-01-14 RX ADMIN — SCOPALAMINE 1 PATCH: 1 PATCH, EXTENDED RELEASE TRANSDERMAL at 14:44

## 2025-01-14 RX ADMIN — PHENYLEPHRINE HYDROCHLORIDE 40 MCG/MIN: 10 INJECTION, SOLUTION INTRAVENOUS at 16:04

## 2025-01-14 RX ADMIN — ACETAMINOPHEN 975 MG: 325 TABLET, FILM COATED ORAL at 13:41

## 2025-01-14 RX ADMIN — EPHEDRINE SULFATE 10 MG: 50 INJECTION INTRAVENOUS at 16:06

## 2025-01-14 RX ADMIN — PROPOFOL 160 MG: 10 INJECTION, EMULSION INTRAVENOUS at 15:42

## 2025-01-14 RX ADMIN — Medication 100 MCG: at 15:48

## 2025-01-14 RX ADMIN — BUPIVACAINE 20 ML: 13.3 INJECTION, SUSPENSION, LIPOSOMAL INFILTRATION at 14:37

## 2025-01-14 RX ADMIN — SODIUM CHLORIDE, SODIUM LACTATE, POTASSIUM CHLORIDE, AND CALCIUM CHLORIDE: .6; .31; .03; .02 INJECTION, SOLUTION INTRAVENOUS at 17:43

## 2025-01-14 RX ADMIN — ROCURONIUM BROMIDE 20 MG: 10 INJECTION, SOLUTION INTRAVENOUS at 16:16

## 2025-01-14 RX ADMIN — ROCURONIUM BROMIDE 20 MG: 10 INJECTION, SOLUTION INTRAVENOUS at 17:43

## 2025-01-14 RX ADMIN — ROCURONIUM BROMIDE 40 MG: 10 INJECTION, SOLUTION INTRAVENOUS at 15:42

## 2025-01-14 RX ADMIN — DEXAMETHASONE SODIUM PHOSPHATE 10 MG: 10 INJECTION, SOLUTION INTRAMUSCULAR; INTRAVENOUS at 16:00

## 2025-01-14 RX ADMIN — TRANEXAMIC ACID 1000 MG: 10 INJECTION, SOLUTION INTRAVENOUS at 14:46

## 2025-01-14 RX ADMIN — GABAPENTIN 300 MG: 100 CAPSULE ORAL at 13:42

## 2025-01-14 RX ADMIN — ONDANSETRON 4 MG: 2 INJECTION INTRAMUSCULAR; INTRAVENOUS at 16:00

## 2025-01-14 RX ADMIN — OXYCODONE HYDROCHLORIDE 5 MG: 5 TABLET ORAL at 19:05

## 2025-01-14 RX ADMIN — FENTANYL CITRATE 50 MCG: 50 INJECTION, SOLUTION INTRAMUSCULAR; INTRAVENOUS at 14:36

## 2025-01-14 RX ADMIN — LIDOCAINE HYDROCHLORIDE 40 MG: 10 INJECTION, SOLUTION EPIDURAL; INFILTRATION; INTRACAUDAL; PERINEURAL at 15:42

## 2025-01-14 RX ADMIN — BUPIVACAINE HYDROCHLORIDE 10 ML: 5 INJECTION, SOLUTION EPIDURAL; INTRACAUDAL; PERINEURAL at 14:37

## 2025-01-14 RX ADMIN — CEFAZOLIN SODIUM 2000 MG: 2 SOLUTION INTRAVENOUS at 15:48

## 2025-01-14 RX ADMIN — CHLORHEXIDINE GLUCONATE 0.12% ORAL RINSE 15 ML: 1.2 LIQUID ORAL at 13:42

## 2025-01-14 RX ADMIN — MIDAZOLAM 2 MG: 1 INJECTION INTRAMUSCULAR; INTRAVENOUS at 14:33

## 2025-01-14 RX ADMIN — SUGAMMADEX 200 MG: 100 INJECTION, SOLUTION INTRAVENOUS at 18:09

## 2025-01-14 RX ADMIN — SODIUM CHLORIDE, SODIUM LACTATE, POTASSIUM CHLORIDE, AND CALCIUM CHLORIDE: .6; .31; .03; .02 INJECTION, SOLUTION INTRAVENOUS at 14:19

## 2025-01-14 RX ADMIN — EPHEDRINE SULFATE 5 MG: 50 INJECTION INTRAVENOUS at 16:43

## 2025-01-14 RX ADMIN — FENTANYL CITRATE 50 MCG: 50 INJECTION, SOLUTION INTRAMUSCULAR; INTRAVENOUS at 14:33

## 2025-01-14 NOTE — OP NOTE
OPERATIVE REPORT  PATIENT NAME: Sarah Fisher    :  1970  MRN: [id number]  Pt Location: [facility] OR ROOM 03    SURGERY DATE: 2025    Surgeons and Role:     * Sid Low, DO - Primary     * Rossy Franco PA-C - Assisting     * Walter Meza Jr. MS, ATC - Assisting    Preop Diagnosis:  Humerus surgical neck fracture [S42.213A]    Post-Op Diagnosis Codes:     * Humerus surgical neck fracture [S42.213A]    Procedure(s):  Right - REVERSE SHOULDER ARTHROPLASTY  Right - OPEN SUBPECTORAL BICEPS TENODESIS    Implant Name Type Inv. Item Serial No.  Lot No. LRB No. Used Action   GLENOID PIN 2.8MM DYNANITE VIP - [id number]  GLENOID PIN 2.8MM DYNANITE VIP  [employer] [id number] Right 1 Implanted and Explanted   SYS REPAIR FXBRIDGE TUBEROSITY - [id number]  SYS REPAIR FXBRIDGE TUBEROSITY  [employer] [id number] Right 1 Implanted   CUP SUTURE 33 NEUTRAL UNIVERS REVERS - [id number]  CUP SUTURE 33 NEUTRAL UNIVERS REVERS  [employer] [id number] Right 1 Implanted   BASEPLATE 24MM +2 MODULAR - [id number]  BASEPLATE 24MM +2 MODULAR  [employer] [id number] Right 1 Implanted   SCREW CENTRAL 30MM MODULAR - [id number]  SCREW CENTRAL 30MM MODULAR  [employer] [id number] Right 1 Implanted   GLENOSPERE 36/24 - [id number]  GLENOSPERE 36/24  [employer] [id number] Right 1 Implanted   SCREW PERIPHERAL 5.5 X 20MM LCK - [id number]  SCREW PERIPHERAL 5.5 X 20MM LCK  [employer] [id number] Right 1 Implanted   SCREW PERIPHERAL 5.5 X 20MM LCK - [id number]  SCREW PERIPHERAL 5.5 X 20MM LCK  [employer] [id number] Right 1 Implanted   SCREW PERIPHERAL 5.5 X 24MM LCK - [id number]  SCREW PERIPHERAL 5.5 X 24MM LCK  [employer] [id number] Right 1 Implanted   SCREW PERIPHERAL 4.5 X 36MM NON LCK - [id number]  SCREW PERIPHERAL 4.5 X 36MM NON LCK  [employer] [id number] Right 1 Implanted   STEM HUM SZ 8 UNIVERS REVERS - [id number]  STEM HUM SZ 8 UNIVERS REVERS  [employer] [id number] Right 1 Implanted   INSERT HUM COMBO 33 +3/36 UNIVERSAL MDLR GLENOOID -  [id number]  INSERT HUM COMBO 33 +3/36 UNIVERSAL MDLR GLENOOID  [employer] [id number] Right 1 Implanted         Specimen(s):  * No specimens in log *    Estimated Blood Loss:   400 mL    Drains:  * No LDAs found *    Anesthesia Type:   General w/ Regional    Operative Indications:  Humerus surgical neck fracture [S42.213A]  The patient is a 54 year-old  who comes in to me with significant problems associated with right shoulder. After failure of conservative nonoperative care, eventually came and saw me, on physical examination with x-ray and radiographic findings, found to have a comminuted 4 part proximal humerus fracture.  The risks and benefits of surgical intervention were explained including, but not exclusive to, infection, DVT, loss of range of motion, stiffness, continuance of pain, failure, fracture, dislocation, delayed union, malunion, nonunion, wound complications, and neurovascular compromise.      Operative Findings:  As expected      Complications:   None    Procedure and Technique:  Patient was seen examined in the preoperative holding area.  Patient's identity was confirmed with the wrist band and hospital chart. The operative extremity was marked in the laterality was confirmed and signed by both the patient and myself matching the consent.    Patient was brought to the operating room where there placed supine in a beach chair position. Anesthesia was induced.  All bony prominences were padded.  A wedge was placed below the legs.  Patient was positioned upright in a beach chair position using the foam head positioner in a neutral cervical alignment.  The well arm was placed on a padded arm jimenez.     Shoulder sterilely prepped and draped.  A formal timeout was performed.  A deltopectoral incision was made from just lateral to the coracoid process down through the inferior axillary fold.  The subcutaneous tissues were dissected and cauterized with the Bovie.  The cephalic vein was identified and  protected and taken laterally along with the tributaries.  A Gabby retractor was placed deep to the pec and deltoid where the subpectoral subdeltoid spaces were bluntly dissected.  The subacromial space was opened with a Schnidt followed by Hohmann retractor.  The lateral border the conjoined tendon was identified and the Gabby retractor was advanced deep to the conjoined.  The fracture was identified and the obvious deformity of the humeral shaft was located anteriorly.  The soft tissue was removed bluntly with a Turner elevator.  We irrigated and suctioned hematoma from the fracture site.  We removed several pieces of fracture debris.  We preserved a large butterfly fragment that was present from the anterior humeral shaft, containing some soft tissue attachments to the pectoralis major medially.  The biceps tendon was tenodesed to the upper border the pec tendon, after taking down approximately 1 cm pec tendon.  This was tied with a #5 Ethibond in figure-of-eight fashion.  The biceps was then tenotomized above the tenodesis site and dissected proximally into the rotator interval.  The suture from the biceps tenodesis was cut at approximately 5 cm from the top of the pectoralis tendon for later measuring of the prosthetic height.        The biceps was traced proximally to identify her greater and lesser tuberosities, which were both fractured.  The greater tuberosity was highly comminuted however some rotator cuff attachments were visualized and tagged with FiberWire suture.  We placed a threaded Schanz pin into the humeral head to help assist with extraction and identification of the lesser tuberosity.  We then used an osteotome to complete the fracture of the lesser tuberosity as there was some remaining subscapularis attached.  We again tacked this with FiberWire suture for later suture passage to our prosthesis.    We then used a Schnidt device to open the capsule labral junction anteriorly and a Turner was  inserted followed by a Batman retractor.  A Fukuda retractor was placed along the posterior inferior glenoid and the humerus was retracted posteriorly while the arm was taken to abduction external rotation to increase her exposure.  A reverse Hohmann retractor was placed at the 12 o'clock position.  The stump of the biceps tendon was identified and the biceps and labrum were excised in paralabral fashion.  The inferior border of the glenoid was dissected bluntly using digital palpation to retract the capsular tissue inferiorly while Bovie and along the face of the glenoid.  We then utilized a deep 15 blade to create this interval and a Turner retractor to again identify 6 o'clock position of the glenoid.  We marked the center position of her glenoid and any remaining cartilage was removed with a curette and a Turner.  We irrigated extensively with the Pulsavac.    We then utilized our guide to place a guidepin in the low center position along the glenoid, exiting anteriorly at Moisés's point.  This was palpated with digit to confirm the appropriate exit point.  We then utilized a reamer to remove 1 to 2 mm of cartilage/bone from the glenoid.  We then measured our depth of our central pin.  After appropriate reaming and evaluation of baseplate seating, we determined there was greater than 90% seating.  We irrigated extensively with a Pulsavac.  We placed a Arthrex universe 24mm +2 lateralized modular glenoid baseplate.  This was twisted into place with a 30 central screw, and had excellent fixation.  The baseplate was flush against the native bone.  For these reasons we decided to proceed with 4 peripheral screws.  We placed a 4.5 x 36mm mm locking screw inferiorly followed by a 5.5 by 20, 20, 24 mm locking screws posterior, superior, anteriorly.  We then utilized a peripheral reamer and decided to proceed with a 36mm standard glenosphere.  This was inserted and malleted into place and the locking screw was placed within  the glenosphere.  We irrigated extensively with a Pulsavac.    We then removed our retractors and returns for humerus.  Humeral shaft was brought out anteriorly and retractors used protect the glenosphere posteriorly.  We used a canal finder and then attempted sequential broaching.  Due to the intact medial calcar we were able to get fixation with a size 8 broach as it rested right on the calcar.  This was rotationally stable.  We able to trial and confirmed that this could be reduced with appropriate stability.  We then retrieved bone graft from the humeral head and performed impaction grafting before final size 8 stem was opened along with a 33 mm central suture cup.  We then malleted this into place which had excellent fixation.  We compared this to where our trial broach sat and it was slightly 1 to 2 mm more elevated.  The Ethibond suture from the biceps tenodesis was used to evaluate the top of the suture cup, which was approximately 5 cm from the top of the pec.  We felt that this height was appropriate and trialing demonstrated stability with a +3 polyethylene.  We then opened a 33+3 polyethylene and impacted this into place.  This demonstrated excellent stability with minimal lateral shuck.    We then irrigated again extensively with combination of Pulsavac and Irrisept solution.  We passed our fiber tape sutures from the posterior superior suture cup through the supraspinatus.  We repeated this process for the subscapularis.  We then completed tuberosity to tuberosity suture repair according to the Arthrex suture bridge construct.  Prior to inserting her stem we had placed two 2 mm drill holes in the shaft, just distal to the fracture site.  We had placed 2 fiber tape sutures through this and used a free needle to perform a cerclage for tuberosity to shaft fixation.  We utilized free suture tape material as well as the fiber tape from the shaft sutures to cerclage our butterfly fragment from the anterior  humeral cortex.  These were tightened down with the use of a heavy needle  by my assistant.  We were completed we had excellent fixation of her tuberosities however it should be noted that the greater tuberosity/supraspinatus had limited excursion/mobility.    We then completed the remainder of irrigation and Irrisept solution.  We placed 1 g of vancomycin solution within the wound.The deltopectoral interval was closed with #5 Ethibond and direct-current fashion.  We took care to avoid incarcerating the cephalic vein.  We then closed the subcutaneous tissues with 2-0 Vicryl followed by a running  strata fix.  Skin glue was applied followed by sterile dressing.  The arm was placed into a UltraSling and demonstrated brisk capillary refill and a 2+ radial pulse.    Patient was extubated and transported the PACU in stable condition.     I was present for the entire procedure., A qualified resident physician was not available., and A physician assistant was required during the procedure for retraction, tissue handling, dissection and suturing.    Patient Disposition:  PACU       SIGNATURE: Sid Low DO  DATE: January 14, 2025  TIME: 6:26 PM

## 2025-01-14 NOTE — ANESTHESIA PROCEDURE NOTES
Peripheral Block    Patient location during procedure: holding area  Start time: 1/14/2025 2:33 PM  Reason for block: procedure for pain, at surgeon's request and post-op pain management  Staffing  Performed by: Miracle Bueno MD  Authorized by: Miracle Bueno MD    Preanesthetic Checklist  Completed: patient identified, IV checked, site marked, risks and benefits discussed, surgical consent, monitors and equipment checked, pre-op evaluation and timeout performed  Peripheral Block  Patient position: supine  Prep: ChloraPrep  Patient monitoring: continuous pulse oximetry, frequent blood pressure checks and heart rate  Block type: Interscalene  Laterality: right  Injection technique: single-shot  Procedures: ultrasound guided, Ultrasound guidance required for the procedure to increase accuracy and safety of medication placement and decrease risk of complications.  Ultrasound permanent image saved  bupivacaine (PF) (MARCAINE) 0.5 % injection 20 mL - Perineural   10 mL - 1/14/2025 2:37:00 PM  bupivacaine liposomal (EXPAREL) 1.3 % injection 20 mL - Perineural   20 mL - 1/14/2025 2:37:00 PM  midazolam (VERSED) injection 0.5 mg - Intravenous   2 mg - 1/14/2025 2:33:00 PM  fentanyl citrate (PF) 100 MCG/2ML 50 mcg - Intravenous   50 mcg - 1/14/2025 2:33:00 PM   50 mcg - 1/14/2025 2:36:00 PM  Needle  Needle type: Stimuplex   Needle gauge: 22 G  Needle length: 4 in  Needle localization: ultrasound guidance  Needle insertion depth: 2 cm  Assessment  Injection assessment: frequent aspiration, injected with ease, negative aspiration, no paresthesia on injection, negative for heart rate change, no symptoms of intraneural/intravenous injection, needle tip visualized at all times and incremental injection  Paresthesia pain: none  Post-procedure:  site cleaned  patient tolerated the procedure well with no immediate complications  Additional Notes  Right interscalene nerve block, ultrasound guidance  20 cc exparel, 10 cc  0.5% bupiv

## 2025-01-14 NOTE — ANESTHESIA POSTPROCEDURE EVALUATION
Post-Op Assessment Note    CV Status:  Stable  Pain Score: 0    Pain management: adequate       Mental Status:  Alert and awake   Hydration Status:  Euvolemic   PONV Controlled:  Controlled   Airway Patency:  Patent     Post Op Vitals Reviewed: Yes    No anethesia notable event occurred.    Staff: CRNA           Last Filed PACU Vitals:  Vitals Value Taken Time   Temp     Pulse 78    BP     Resp 15    SpO2 93

## 2025-01-14 NOTE — DISCHARGE INSTR - AVS FIRST PAGE
Dr. Low Reverse Shoulder Replacement    What to Expect/Activity  Always wear the sling unless performing exercises (described below) or for hygiene.   Once your nerve block is fully resolved, you may use your operative arm to lift up to the weight of a cupa of coffee directly in front of your body (i.e., it is ok to use this arm to eat and drink if you wish).  You may sleep however you would like but you must sleep in the sling. Some people find it more comfortable for a few days to sleep in an upright position or reclining chair.  While in the sling or when the sling is off, specifically avoid having your arm rotate away from your body or behind your body.   Focus on keeping your elbow in front of your body  The worst positions to place yourself in are:  Reaching up and away from your body to grab something  Placing your arm behind you to push up out of a chair/bed (especially avoid this one).  Regular finger, wrist and elbow range of motion to prevent stiffness.  Please use ice packs for 20-30 minutes every 1-2 hours for 48-72 hours on the operative hip. Please make sure there is a barrier directly between your skin and your ice/ice pack.  On post operative day #3, you can start pendulum exercises (see diagram). Again, be careful to avoid the positions described above.  Dressing/Wound Care/Bathing  There is a surgical dressing over your incision that stays in place for 7 days after surgery.   You may start showering 48 hours after surgery, the surgical dressing will remain in place. Please pat the dressing dry. If you notice the dressing appears saturated or is starting to come off, please contact the office.  On the 7th day, remove dressing carefully. Your sutures will be under the skin. If you notice light drainage after removing the dressing please cover with a dry dressing. If this drainage continues please contact the office.  There may be dried blood around the incision. It is ok to continue  showering after removing the dressing but do not scrub the incision. Pat incision dry.  Do not place any creams, ointments or gels on or around the incision.  No baths, swimming or submerging until cleared by Dr. Low  Pain Management/Medications  You may resume your usual medications.  Please take the following medications:  Anti-coagulation (blood clot prevention) -Aspirin daily  Pain medication:  Narcotic Medication: Take as prescribed  Tylenol 1000mg every 8 hours  Naproxen 500 mg every 8 hours  While taking your narcotic medication, an over-the-counter stool softener may be helpful to prevent constipation. Please consider taking Livia-Colace twice daily while taking this medication.  If you have questions or pain concerns, please contact the office. Pain medication cannot remove all post-operative pain.  Follow up/Call if:  The findings of your surgery will be explained to you and your family immediately after surgery. However, in the post-operative period, during recovery from anesthesia you may not fully remember or fully understand what was said. This will be again when you return for your post-op appointment.  Please contact Dr. Low's office if you experience the following:  Excessive bleeding (bleeding through your dressing)  Fever greater than 101 degrees F  Persistent nausea or vomiting  Decreased sensation or discoloration of the operative limb  Pain or swelling that is getting worse and not better with medication

## 2025-01-14 NOTE — INTERVAL H&P NOTE
H&P reviewed. After examining the patient I find no changes in the patients condition since the H&P had been written.    Vitals:    01/14/25 1351   BP: 114/68   Pulse: 76   Resp: 18   Temp: 97.9 °F (36.6 °C)   SpO2: 92%

## 2025-01-15 NOTE — ANESTHESIA POSTPROCEDURE EVALUATION
Post-Op Assessment Note    CV Status:  Stable  Pain Score: 0    Pain management: adequate       Mental Status:  Alert and awake   Hydration Status:  Stable   PONV Controlled:  None   Airway Patency:  Patent     Post Op Vitals Reviewed: Yes    No anethesia notable event occurred.    Staff: Anesthesiologist           Last Filed PACU Vitals:  Vitals Value Taken Time   Temp 97.3 °F (36.3 °C) 01/14/25 1900   Pulse 96 01/14/25 1922   /66 01/14/25 1930   Resp 18 01/14/25 1930   SpO2 91 % 01/14/25 1922   Vitals shown include unfiled device data.    Modified Vladimir:     Vitals Value Taken Time   Activity 2 01/14/25 1920   Respiration 2 01/14/25 1920   Circulation 2 01/14/25 1920   Consciousness 2 01/14/25 1920   Oxygen Saturation 2 01/14/25 1920     Modified Vladimir Score: 10

## 2025-01-16 ENCOUNTER — TELEPHONE (OUTPATIENT)
Dept: OBGYN CLINIC | Facility: HOSPITAL | Age: 55
End: 2025-01-16

## 2025-01-16 DIAGNOSIS — S42.211A CLOSED DISPLACED FRACTURE OF SURGICAL NECK OF RIGHT HUMERUS, UNSPECIFIED FRACTURE MORPHOLOGY, INITIAL ENCOUNTER: ICD-10-CM

## 2025-01-16 RX ORDER — OXYCODONE HYDROCHLORIDE 5 MG/1
5 TABLET ORAL EVERY 4 HOURS PRN
Qty: 12 TABLET | Refills: 0 | Status: SHIPPED | OUTPATIENT
Start: 2025-01-16

## 2025-01-16 NOTE — TELEPHONE ENCOUNTER
Called and spoke w/pt and relayed Dr Low's and FRANCISCO Franco's msg. Pt states the nerve block wore off yesterday. Pt has been in pain. She was taking the naprosyn and tylenol and motrin. Advised not to take Motrin while on naprosyn. Reviewed medications w/pt. She has questions about PT. Her understanding is that she is to remain in sling except for taking a shower and she is to keep her arm by her side. When I reviewed exercising fingers, wrist and elbow. She said that she is doing fingers and wrist but not really elbow because she was told not to take her arm out of sling except for shower and to keep it by her side.  Also, I do not believe pt has made appt for PT to evaluate and treat yet. She states she is doing what Dr Low told her to do. Please review and advise.

## 2025-01-16 NOTE — TELEPHONE ENCOUNTER
She can continue to do these exercises at home on her own and can begin outpatient physical therapy after we see her postoperatively.  She should reach out to physical therapy beforehand to schedule an appointment however

## 2025-01-16 NOTE — TELEPHONE ENCOUNTER
Caller: Patient    Doctor: Devante    Reason for call: Patient stated the nerve block wore off and she's in a lot of pain 10/10. Patient has 8 tablets of Oxycodone left and is requesting a refill. Please advise.    Call back#: [phone]

## 2025-01-23 ENCOUNTER — OFFICE VISIT (OUTPATIENT)
Dept: OBGYN CLINIC | Facility: CLINIC | Age: 55
End: 2025-01-23

## 2025-01-23 ENCOUNTER — TELEPHONE (OUTPATIENT)
Dept: OBGYN CLINIC | Facility: CLINIC | Age: 55
End: 2025-01-23

## 2025-01-23 ENCOUNTER — APPOINTMENT (OUTPATIENT)
Dept: RADIOLOGY | Facility: CLINIC | Age: 55
End: 2025-01-23
Payer: MEDICARE

## 2025-01-23 ENCOUNTER — TELEPHONE (OUTPATIENT)
Age: 55
End: 2025-01-23

## 2025-01-23 VITALS — WEIGHT: 159 LBS | HEIGHT: 64 IN | BODY MASS INDEX: 27.14 KG/M2

## 2025-01-23 DIAGNOSIS — S42.211A CLOSED DISPLACED FRACTURE OF SURGICAL NECK OF RIGHT HUMERUS, UNSPECIFIED FRACTURE MORPHOLOGY, INITIAL ENCOUNTER: Primary | ICD-10-CM

## 2025-01-23 DIAGNOSIS — S42.211A CLOSED DISPLACED FRACTURE OF SURGICAL NECK OF RIGHT HUMERUS, UNSPECIFIED FRACTURE MORPHOLOGY, INITIAL ENCOUNTER: ICD-10-CM

## 2025-01-23 PROCEDURE — 73030 X-RAY EXAM OF SHOULDER: CPT

## 2025-01-23 PROCEDURE — 99024 POSTOP FOLLOW-UP VISIT: CPT | Performed by: STUDENT IN AN ORGANIZED HEALTH CARE EDUCATION/TRAINING PROGRAM

## 2025-01-23 RX ORDER — QUETIAPINE FUMARATE 150 MG/1
1 TABLET, FILM COATED ORAL EVERY EVENING
COMMUNITY
Start: 2024-12-06

## 2025-01-23 RX ORDER — TRAMADOL HYDROCHLORIDE 50 MG/1
50 TABLET ORAL EVERY 8 HOURS PRN
Qty: 42 TABLET | Refills: 0 | Status: SHIPPED | OUTPATIENT
Start: 2025-01-23 | End: 2025-01-23

## 2025-01-23 RX ORDER — NICOTINE 21 MG/24HR
PATCH, TRANSDERMAL 24 HOURS TRANSDERMAL
COMMUNITY
Start: 2024-12-10

## 2025-01-23 RX ORDER — METHOCARBAMOL 750 MG/1
TABLET, FILM COATED ORAL
COMMUNITY
Start: 2025-01-01

## 2025-01-23 NOTE — TELEPHONE ENCOUNTER
I called and spoke to this patient we are not prescribing tramadol, she was recently given 90 tablets of this medication on 1/8/25 provided by another physician, refill is not appropriate spoke with pharmacist who expressed her concern revolving tramadol to my attention, further relayed that can't be refilled due to timeline.

## 2025-01-23 NOTE — TELEPHONE ENCOUNTER
"Spoke to Sarah on the phone. Discussed that I called and spoke to the pharmacy regarding her tramadol prescription, pharmacist relayed to me that patient was prescribed  90 tablets of tramadol on 1/8/25. When speaking to the patient she states that her daughter \"threw away bother her tramadol and her muscle relaxer in the trash and she can't dig it out of the trash as it was already collected by the \". She was very frustrated and has concerns about her pain, I discussed that we are not able to fill the prescription as she has been getting 90 tablets months from Dr. Arguelles. She was upset. Dicussed that for pain she can take naproxen, tylenol, use ice and can try lidocaine patches, however, this should not be placed over her incision and should be place on the lateral or posterior shoulder.     Rossy Franco PA-C   "

## 2025-01-23 NOTE — PROGRESS NOTES
Shoulder Post Operative Visit     Assesment:     54 y.o. female 9 day s/p  right total reverse shoulder arthroplasty for fracture; axillary nerve palsy, DOS: 1/14/25    Plan:  Patient presents for first postop visit.  Radiographs are stable.  Wound is healing well.  She has a improving but persistent axillary nerve palsy.  She has diminished but slightly improved sensation over the lateral shoulder but still is not able to fire the deltoid.  I explained her postoperative recovery timeline will likely be delayed by the presence of her axillary nerve palsy, which was present preoperatively from her injury.  She is in excruciating pain and unable to tolerate the pain or get any sleep with the use of Tylenol and naproxen.  For this I did agree to prescribe her tramadol rather than continue with oxycodone. She should present to physical therapy for passive range of motion according to our protocol and follow-up in approximately 4 weeks for reevaluation.    Post-Operative treatment:    Ice to shoulder 1-2 times daily, for 20 minutes at a time.  PT for ROM and strengthening to shoulder, rotator cuff, scapular stabilizers.    Imaging:    All imaging from today was reviewed by myself and explained to the patient.     Sling:  at all times other than showering    DVT Prophylaxis:  Aspirin 81 mg oral twice daily x 2.5 weeks, Ambulation    Follow up:   4 weeks     Patient was advised that if they have any fevers, chills, chest pain, shortness of breath, redness or drainage from the incision, please let our office know immediately.        Chief Complaint   Patient presents with    Right Shoulder - Fracture, Post-op       History of Present Illness:    The patient is a 54 y.o. female who is 9 day s/p  right total reverse shoulder arthroplasty, DOS: 1/14/25. Patient states that she has been in significant pain in her posterior shoulder that radiates to her elbow, has numbness in the posterior shoulder but does not radiate to the  "fingertips.  She describes a stretching and burning feeling in the lateral shoulder.  She reports improved sensation over the lateral shoulder compared to preoperatively but has continued weakness and numbness.  She has excruciating pendant pain that is 10 out of 10 unable to get more than 1 to 2 hours of sleep without awakening.  She has not yet begun physical therapy.  She is managing her pain with Tylenol and naproxen as she is out of oxycodone.  She is anxious about her ability to returned to work and pay her bills.      The patient denies any fevers, chills, calf pain, chest pain/shortness of breath, redness or drainage from the incision.         I have reviewed the past medical, surgical, social and family history, medications and allergies as documented in the EMR.    Review of systems: ROS is negative other than that noted in the HPI.  Constitutional: Negative for fatigue and fever.       Physical Exam:    Height 5' 3.5\" (1.613 m), weight 72.1 kg (159 lb).    General/Constitutional: NAD, well developed, well nourished  HENT: Normocephalic, atraumatic  CV: Intact distal pulses, regular rate  Resp: No respiratory distress or labored breathing  Lymphatic: No lymphadenopathy palpated  Neuro: Alert and Oriented x 3, no focal deficits  Psych: Normal mood, normal affect, normal judgement, normal behavior  Skin: Warm, dry, no rashes, no erythema    Shoulder focused exam:     Incision is healing well.  No erythema or drainage  Livia-incisional tenderness palpation  Shoulder demonstrates some deltoid atrophy  There is diminished but present sensation over the posterior deltoid, absent sensation over the anterior and middle deltoid  She is unable to fire the deltoid  She has intact radial nerve  Passive range of motion was minimally tested but some gentle rotational motion in forward flexion to 40 degrees was poorly tolerated      UE NV Exam: +2 Radial pulses bilaterally  Sensation intact to light touch C5-T1 " bilaterally, Radial/median/ulnar nerve motor intact      Radiographs of the right shoulder demonstrate status post reverse shoulder arthroplasty for fracture.  The greater tuberosity is in a slightly superior position however appears stable from postoperative films at its attachment of the superior lateral edge of the suture cup.  The calcar fragment and lesser tuberosity are in appropriate position.  The implant is well-positioned without any signs of instability.  Screw lengths are appropriate.  I do not currently have radiologist interpretation however we will follow-up once 1 is performed.    Scribe Attestation      I,:   am acting as a scribe while in the presence of the attending physician.:       I,:   personally performed the services described in this documentation    as scribed in my presence.:

## 2025-01-23 NOTE — TELEPHONE ENCOUNTER
Caller: Patient    Doctor: Dr. Low    Reason for call: Patient calling stating that a prescription for Tramadol was sent to the pharmacy.  Patient stating that the pharmacy will not fill prescription until 2/4/25 because a prior prescription for Tramadol was filled and thrown out by her daughter because she was prescribed Oxycodone on 1/16/24.  Patient stating that she no longer has the Oxycodone.  Pharmacy asking for call back for approval to fill Tramadol and requires prior authorization.      Call back#: [phone]

## 2025-01-23 NOTE — TELEPHONE ENCOUNTER
Caller: Patient    Doctor: Devante Sanford    Reason for call: Patient is calling back in regards to previous message and needs Dr to call the pharmacy to confirm medication refill. Please call patient to confirm once pharmacy has been called.     Call back#: [phone]     Pharmacy#: [phone]

## 2025-01-29 DIAGNOSIS — S42.211A CLOSED DISPLACED FRACTURE OF SURGICAL NECK OF RIGHT HUMERUS, UNSPECIFIED FRACTURE MORPHOLOGY, INITIAL ENCOUNTER: ICD-10-CM

## 2025-01-29 RX ORDER — ONDANSETRON 4 MG/1
4 TABLET, FILM COATED ORAL EVERY 8 HOURS PRN
Qty: 15 TABLET | Refills: 0 | Status: SHIPPED | OUTPATIENT
Start: 2025-01-29

## 2025-01-29 RX ORDER — ACETAMINOPHEN 500 MG
1000 TABLET ORAL EVERY 6 HOURS PRN
Qty: 56 TABLET | Refills: 0 | Status: SHIPPED | OUTPATIENT
Start: 2025-01-29 | End: 2025-02-12

## 2025-01-29 RX ORDER — NAPROXEN 500 MG/1
500 TABLET ORAL 2 TIMES DAILY WITH MEALS
Qty: 28 TABLET | Refills: 0 | Status: SHIPPED | OUTPATIENT
Start: 2025-01-29 | End: 2025-02-12

## 2025-01-29 NOTE — TELEPHONE ENCOUNTER
Caller: Sarah    Doctor: Devante / Darshana    Reason for call: Wanted to let you know that she is running a very low grade fever usually happens at night. She has a sling from prior to her surgery is she able to wear that in the shower?  It is a mesh sling    Call back#: [phone]

## 2025-02-03 ENCOUNTER — EVALUATION (OUTPATIENT)
Dept: PHYSICAL THERAPY | Facility: CLINIC | Age: 55
End: 2025-02-03
Payer: MEDICARE

## 2025-02-03 DIAGNOSIS — S42.211A CLOSED DISPLACED FRACTURE OF SURGICAL NECK OF RIGHT HUMERUS, UNSPECIFIED FRACTURE MORPHOLOGY, INITIAL ENCOUNTER: ICD-10-CM

## 2025-02-03 DIAGNOSIS — S42.211A CLOSED DISPLACED FRACTURE OF SURGICAL NECK OF RIGHT HUMERUS, INITIAL ENCOUNTER: Primary | ICD-10-CM

## 2025-02-03 PROCEDURE — 97110 THERAPEUTIC EXERCISES: CPT | Performed by: PHYSICAL THERAPIST

## 2025-02-03 PROCEDURE — 97162 PT EVAL MOD COMPLEX 30 MIN: CPT | Performed by: PHYSICAL THERAPIST

## 2025-02-03 NOTE — PROGRESS NOTES
PT Evaluation     Today's date: 2/3/2025  Patient name: Sarah Fisher  : 1970  MRN: [id number]  Referring provider: Rossy Franco PA-C  Dx:   Encounter Diagnosis     ICD-10-CM    1. Closed displaced fracture of surgical neck of right humerus, initial encounter  S42.211A                Assessment  Impairments: abnormal or restricted ROM, abnormal movement, activity intolerance, impaired physical strength, lacks appropriate home exercise program, pain with function, poor posture  and poor body mechanics  Functional limitations: overhead lift, carrying groceries, drive, reach behind back tuck in shirt, bartending    Assessment details: Sarah Fisher is a 54 y.o. female who presents to PT post of R RTSA performed on 25 as expected since she has Axillary nerve palsy. She presents with pain, decreased strength, decreased ROM, and postural dysfunction. Due to these impairments, patient has difficulty performing ADL's, recreational activities, work-related activities, lifting/carrying, transfers, reaching. Patient's clinical presentation is consistent with their referring diagnosis of Closed displaced fracture of surgical neck of right humerus, initial encounter  (primary encounter diagnosis), Closed displaced fracture of surgical neck of right humerus, unspecified fracture morphology, initial encounter. Patient has been educated in post-op contraindications / precautions, wound care, home exercise program, and plan of care. Patient would benefit from skilled physical therapy services to address their aforementioned functional limitations and progress towards prior level of function and independence with home exercise program.      Prognosis: fair  Prognosis details: + factors: high motivation levels, age  - factors: nerve palsy of Axillary nerve, post op reverse total shoulder, history of cervical radiculopathy     Goals  Short Term Goals to be accomplished by 3/3/25:  STG1: Pt will be I with HEP and I with  posture management   STG3: Pt will demo shoulder PROM within post op protocol expectations.   STG4: Pt will be able to generate isometric contraction with deltoid.   STG5: Pt will report pain 5/10 in shoulder at worst.      Long Term Goals to be accomplished by 4/28/25:   LTG1: Pt will be able to reach into overhead shelf without pain.  LTG2: Pt will be able to reach back to tuck in shirt without pain.   LTG3: Pt will be able to change shirt/pants without pain.       Plan  Patient would benefit from: PT eval and skilled physical therapy  Planned modality interventions: cryotherapy, thermotherapy: hydrocollator packs and unattended electrical stimulation    Planned therapy interventions: home exercise program, therapeutic exercise, therapeutic activities, self care, patient education, manual therapy, neuromuscular re-education, joint mobilization, stretching, strengthening and flexibility    Frequency: 2x week  Duration in weeks: 12  Plan of Care beginning date: 2/3/2025  Plan of Care expiration date: 4/28/2025  Treatment plan discussed with: patient  Plan details: HEP development, stretching, strengthening, A/AA/PROM, joint mobilizations, posture education, STM/MI as needed to reduce muscle tension, muscle reeducation, PLOC discussed and agreed upon with patient.          Subjective Evaluation    History of Present Illness  Mechanism of injury: Pt is a 53 y/o R handed female who presents to PT s/p R reverse total shoulder performed on 1/14/25.   PRAVIN: stated that she had a fall as a result of LOC due to medication change and it resulted in prox humeral fracture. She went right to ER, where x-rays were ordered and she was referred to ortho. Nerve palsey was a result of the fall including Axillary nerve. Surgery was performed on 1/14/25 and she went home same day. Takes off sling to shower.  Had severe pain the next day post surgery and  Oxycodone was ordered.   1/23/25 had follow up with surgeon where x-rays were  stable, wound was healing well, however presented with axillary nerve palsy (present pre operatively), diminished sensation over lateral shoulder. She was prescribed Tramadol.     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    Patient Goals  Patient goals for therapy: increased motion, decreased pain, increased strength, independence with ADLs/IADLs and return to work  Patient goal: overhead lift, carrying groceries, drive, reach behind back tuck in shirt, bartending  Pain  Current pain ratin  At best pain ratin  At worst pain rating: 10  Location: R shoulder  Quality: tight, discomfort and pulling  Relieving factors: relaxation and rest  Aggravating factors: lifting and overhead activity    Treatments  Current treatment: medication and physical therapy        Objective     Postural Observations  Seated posture: fair  Standing posture: fair    Additional Postural Observation Details  Forward head and shoulders     Observations     Additional Observation Details  Incision: clean and healing well, minimal scabbing present, no s/s infection, no dehiscence    Passive Range of Motion     Right Shoulder   Flexion: 60 degrees   Internal rotation 0°: 30 degrees     Additional Passive Range of Motion Details  Post op:   Limited by protocol    Strength/Myotome Testing     Additional Strength Details  NT: post op     Tests     Additional Tests Details  NT: post op          Precautions:   History of cervical radiculopathy    Sling till: 25  DOS: 25  REVERSE TOTAL SHOULDER REPLACEMENT  POST-OPERATIVE REHABILITATION PROGRAM  The goal of the rehabilitation process is to provide greater  joint stability to the patient, while decreasing their pain and  improving their functional status. The goal of the surgery & rehab (bone loss, muscle loss) is joint stability and less joint  mobility. The key to the success of the rehabilitation following shoulder replacement is compliance to your exercise  program.  Precautions: Should be implemented for the first 12 weeks following surgery- unless the surgeon specifically advises the  patient differently.  · No shoulder motion behind back ( back pocket motion)  · No excessive shoulder horizontal abduction  · No active external rotation behind head or neck  · No shoulder extension beyond the body  I. PHASE ONE - IMMEDIATE PROTECTED MOTION PHASE (Week 0-6)  Goals: Allow early healing of capsule  Restore passive range of motion  Decrease shoulder pain  Retard muscular atrophy  Patient education  Weeks 0-2  Exercises:   Sling during day and at night (worn for 4 weeks)  Continuous Passive Motion (CPM)   Passive Range of Motion  a. Flexion (0-60 degrees)  b. ER (at 30 degrees Abduction) 0 degrees  c. IR (at 30 degrees Abduction) 30 degrees   Pendulum Exercises   No active shoulder motion   Elbow/Wrist AROM   Gripping Exercises   Isometrics  a. Abductors  b. ER/IR   Cryotherapy for pain   When laying supine use pillow under arm to support glenohumeral joint       -Flexion (0-60 degrees)  - ER (at 30 degrees Abduction) 0 degrees  - IR (at 30 degrees Abduction) 30 degrees 2/4/25  Progress  - flexion to 90 degrees  - ER/IR at 30 degrees abd scapular plane                Manuals 2/3        PROM stretch                                    Neuro Re-Ed 2/3                                                                       Ther Ex 2/3        Wrist flex/ext 10x         Elbow flex/ext  10x         Pendulums flex, abd, circumduction cw, ccw 10x flex/ext   10x horiz abd/add                                   Educated on HEP 10'                  Ther Activity 2/3                                                                                    Evaluate and Treat  REVERSE TOTAL SHOULDER REPLACEMENT  POST-OPERATIVE REHABILITATION PROGRAM  The goal of the rehabilitation process is to provide greater joint stability to the patient, while decreasing their pain and  improving their functional status. The goal of the surgery & rehab (bone loss, muscle loss) is joint stability and less joint  mobility. The key to the success of the rehabilitation following shoulder replacement is compliance to your exercise  program.  Precautions: Should be implemented for the first 12 weeks following surgery- unless the surgeon specifically advises the  patient differently.  · No shoulder motion behind back ( back pocket motion)  · No excessive shoulder horizontal abduction  · No active external rotation behind head or neck  · No shoulder extension beyond the body  I. PHASE ONE - IMMEDIATE PROTECTED MOTION PHASE (Week 0-6)  Goals: Allow early healing of capsule  Restore passive range of motion  Decrease shoulder pain  Retard muscular atrophy  Patient education  Weeks 0-2  Exercises:   Sling during day and at night (worn for 4 weeks)  Continuous Passive Motion (CPM)   Passive Range of Motion  a. Flexion (0-60 degrees)  b. ER (at 30 degrees Abduction) 0 degrees  c. IR (at 30 degrees Abduction) 30 degrees   Pendulum Exercises   No active shoulder motion   Elbow/Wrist AROM   Gripping Exercises   Isometrics  a. Abductors  b. ER/IR   Cryotherapy for pain   When laying supine use pillow under arm to support glenohumeral joint  Weeks 3-4   Continue sling as needed   Continue PROM  a. Progress flexion to 90 degrees  b. ER/IR at 30 degrees abd scapular plane   May initiate AAROM IR/ER   Pendulum exercise   Rope and pulley week 3 to 4  Page 2 of 3  Copyright © 2262-9604 by the [employer] www.[employer].  All Rights Reserved. Any redistribution, alteration, or reproduction of any materials  herein is strictly prohibited.   Continue isometric  a. Initiate rhythmic stabilization drills   Continue use of ice  II. PHASE TWO - ACTIVE MOTION PHASE (Week 6-12)  Goals: Improve Shoulder Strength  Gradually progress Active/Passive Range of Motion  Decrease Pain/Inflammation  Increase Functional Activities  Do not over stress healing tissue  Weeks 6-8  Exercises:   Progress PROM  a. Flexion to  degrees  b. ER/IR at 45 degrees abduction scapular plane  c. IR   Progress AAROM ER/IR at 45 degrees abd   Do not aggressively push ROM into ER   Continue rope and pulley to tolerance  Pendulum exercises   Continue isometrics  a. ER/IR  b. Rhythmic stabilization  c. Initiate deltoid flexion/ext   Ice as needed  Weeks 9-12   Progress PROM to tolerance  a. Flexion to 120-125 degrees  b. ER/IR at 90 degrees abduction (goal is 45-50 degrees of ER motion)  c. ER/IR at 45 degrees abduction  Progress AAROM to tolerance  a. ER/IR at 45 degrees abd  b. Initiate flexion supine L-bar   Initiate AROM exercises  a. Sidelying flexion  b. Supine flexion  c. Sidelying ER   Continue strengthening and stabilization  a. Tubing ER/IR  b. Supine ER  c. Standing full can  d. Prone exercise  e. Biceps   May perform pool exercises   Continue rhythmic stabilization  a. Supine flex/ext  b. Supine ER/IR  Page 3 of 3    Copyright © 3668-9300 by the [employer] www[employer].  All Rights Reserved. Any redistribution, alteration, or reproduction of any materials herein is strictly prohibited.  III. PHASE THREE - MODERATE STRENGTHENING/ACTIVITY PHASE (WEEKS 12-16)  Goals:  Gradually increase PROM  Initiate active light strengthening exercises  Gradually initiate functional activities  Continue precautions with excessive GH joint motion  Exercises:  · continue all exercises listed above  · Initiate light active ROM exercises  · Initiate fundamental shoulder program  IV. PHASE IV - RETURN TO ACTIVITY PHASE  (WEEKS 16- 26)   Initiation of this phase begins when patient exhibits:  1) PROM: Flexion 0-145 degrees  ER (at 90 degrees Abduction) 33-55 degrees  IR (at 90 degrees Abduction) 45-55 degrees  2) Strength level 4/5 for ER/IR/abd  Note: Some patients will not be able to enter this phase  Goals: Improve strength of shoulder musculature  Improve and gradually increase functional activities  Gradual restoration of functional activities  Independent home exercise program  Exercises:   fundamental shoulder exercise program   May continue pool exercises   Should exercise daily   May initiate interval sport program (golf, swim) Physician must approve

## 2025-02-04 ENCOUNTER — TELEPHONE (OUTPATIENT)
Age: 55
End: 2025-02-04

## 2025-02-04 NOTE — TELEPHONE ENCOUNTER
Caller: Patient    Doctor: Dr. Low     Reason for call: Asked how long to keep sling on, also asked for something to stop the itching at the incision     Call back#: [phone]

## 2025-02-05 ENCOUNTER — APPOINTMENT (OUTPATIENT)
Dept: PHYSICAL THERAPY | Facility: CLINIC | Age: 55
End: 2025-02-05
Payer: MEDICARE

## 2025-02-10 ENCOUNTER — TELEPHONE (OUTPATIENT)
Age: 55
End: 2025-02-10

## 2025-02-10 ENCOUNTER — APPOINTMENT (OUTPATIENT)
Dept: PHYSICAL THERAPY | Facility: CLINIC | Age: 55
End: 2025-02-10
Payer: MEDICARE

## 2025-02-10 DIAGNOSIS — S42.211A CLOSED DISPLACED FRACTURE OF SURGICAL NECK OF RIGHT HUMERUS, INITIAL ENCOUNTER: Primary | ICD-10-CM

## 2025-02-10 DIAGNOSIS — S42.211A CLOSED DISPLACED FRACTURE OF SURGICAL NECK OF RIGHT HUMERUS, UNSPECIFIED FRACTURE MORPHOLOGY, INITIAL ENCOUNTER: ICD-10-CM

## 2025-02-10 NOTE — TELEPHONE ENCOUNTER
Caller: Sarah     Doctor: Dr. Low    Reason for call: Patient states whatever was used to close the incision has still not peeled off. Patient would like to know how long it will be there and when it should start to come off. Patient states it is going to be 4 weeks tomorrow and she would also like to know if she can take her sling off tomorrow. Please advise.     Call back#: [phone]

## 2025-02-10 NOTE — PROGRESS NOTES
Daily Note     Today's date: 2/10/2025  Patient name: Sarah Fisher  : 1970  MRN: [id number]  Referring provider: Rossy Franco PA-C  Dx:   Encounter Diagnosis     ICD-10-CM    1. Closed displaced fracture of surgical neck of right humerus, initial encounter  S42.211A       2. Closed displaced fracture of surgical neck of right humerus, unspecified fracture morphology, initial encounter  S42.211A              Subjective: Pt reported that     Objective: See treatment diary below    Assessment: Tolerated treatment well. Patient would benefit from continued PT    Plan: Continue per plan of care.      Precautions:   History of cervical radiculopathy    Sling till: 25  DOS: 25  REVERSE TOTAL SHOULDER REPLACEMENT  POST-OPERATIVE REHABILITATION PROGRAM  The goal of the rehabilitation process is to provide greater joint stability to the patient, while decreasing their pain and  improving their functional status. The goal of the surgery & rehab (bone loss, muscle loss) is joint stability and less joint  mobility. The key to the success of the rehabilitation following shoulder replacement is compliance to your exercise  program.  Precautions: Should be implemented for the first 12 weeks following surgery- unless the surgeon specifically advises the  patient differently.  · No shoulder motion behind back ( back pocket motion)  · No excessive shoulder horizontal abduction  · No active external rotation behind head or neck  · No shoulder extension beyond the body  I. PHASE ONE - IMMEDIATE PROTECTED MOTION PHASE (Week 0-6)  Goals: Allow early healing of capsule  Restore passive range of motion  Decrease shoulder pain  Retard muscular atrophy  Patient education  Weeks 0-2  Exercises:   Sling during day and at night (worn for 4 weeks)  Continuous Passive Motion (CPM)   Passive Range of Motion  a. Flexion (0-60 degrees)  b. ER (at 30 degrees Abduction) 0 degrees  c. IR (at 30 degrees Abduction) 30 degrees    Pendulum Exercises   No active shoulder motion   Elbow/Wrist AROM   Gripping Exercises   Isometrics  a. Abductors  b. ER/IR   Cryotherapy for pain   When laying supine use pillow under arm to support glenohumeral joint  Weeks 3-4 (2/11/25 4 weeks)    Continue sling as needed   Continue PROM  a. Progress flexion to 90 degrees  b. ER/IR at 30 degrees abd scapular plane   May initiate AAROM IR/ER   Pendulum exercise   Rope and pulley week 3 to 4   Continue isometric  a. Initiate rhythmic stabilization drills   Continue use of ice  II. PHASE TWO - ACTIVE MOTION PHASE (Week 6-12)  Goals: Improve Shoulder Strength  Gradually progress Active/Passive Range of Motion  Decrease Pain/Inflammation  Increase Functional Activities  Do not over stress healing tissue  Weeks 6-8  Exercises:   Progress PROM  a. Flexion to  degrees  b. ER/IR at 45 degrees abduction scapular plane  c. IR   Progress AAROM ER/IR at 45 degrees abd   Do not aggressively push ROM into ER   Continue rope and pulley to tolerance  Pendulum exercises   Continue isometrics  a. ER/IR  b. Rhythmic stabilization  c. Initiate deltoid flexion/ext   Ice as needed         -Flexion (0-60 degrees)  - ER (at 30 degrees Abduction) 0 degrees  - IR (at 30 degrees Abduction) 30 degrees   Progress  - flexion to 90 degrees  - ER/IR at 30 degrees abd scapular plane                Manuals 2/3 2/10       PROM stretch                                    Neuro Re-Ed 2/3 2/10                                                                      Ther Ex 2/3 2/10       Wrist flex/ext 10x         Elbow flex/ext  10x         Pendulums flex, abd, circumduction cw, ccw 10x flex/ext   10x horiz abd/add                                   Educated on HEP 10'                  Ther Activity 2/3 2/10

## 2025-02-11 NOTE — TELEPHONE ENCOUNTER
Called and left message on pts identified voicemail with Dr Low's message. Advised to call back if any questions

## 2025-02-12 ENCOUNTER — APPOINTMENT (OUTPATIENT)
Dept: PHYSICAL THERAPY | Facility: CLINIC | Age: 55
End: 2025-02-12
Payer: MEDICARE

## 2025-02-17 ENCOUNTER — OFFICE VISIT (OUTPATIENT)
Dept: PHYSICAL THERAPY | Facility: CLINIC | Age: 55
End: 2025-02-17
Payer: MEDICARE

## 2025-02-17 DIAGNOSIS — S42.211A CLOSED DISPLACED FRACTURE OF SURGICAL NECK OF RIGHT HUMERUS, UNSPECIFIED FRACTURE MORPHOLOGY, INITIAL ENCOUNTER: ICD-10-CM

## 2025-02-17 DIAGNOSIS — S42.211A CLOSED DISPLACED FRACTURE OF SURGICAL NECK OF RIGHT HUMERUS, INITIAL ENCOUNTER: Primary | ICD-10-CM

## 2025-02-17 PROCEDURE — 97140 MANUAL THERAPY 1/> REGIONS: CPT | Performed by: PHYSICAL THERAPIST

## 2025-02-17 PROCEDURE — 97110 THERAPEUTIC EXERCISES: CPT | Performed by: PHYSICAL THERAPIST

## 2025-02-17 NOTE — PROGRESS NOTES
Daily Note     Today's date: 2025  Patient name: Sarah Fisher  : 1970  MRN: [id number]  Referring provider: Rossy Franco PA-C  Dx:   Encounter Diagnosis     ICD-10-CM    1. Closed displaced fracture of surgical neck of right humerus, initial encounter  S42.211A       2. Closed displaced fracture of surgical neck of right humerus, unspecified fracture morphology, initial encounter  S42.211A              Subjective: Pt reported that she has been using her sling less since the doc cleared her to wear it less, without an increase in pain. Noted that she has been improving with her ability to don/doff brace. She is concerned about when she will be able to return to driving.     Objective: See treatment diary below    Assessment: Tolerated treatment well. Despite missing PT for about 2 weeks, she demonstrated fair available ROM, almost to protocol expecatations. Per protocol timeline initiated pulleys flexion in standing and supine sh ER AAROM with wand, which she performed well without increased pain. Noted no increase in pain upon leaving session. Patient would benefit from continued PT    Plan: Continue per plan of care.      Precautions:   History of cervical radiculopathy    Sling till: 25  DOS: 25  REVERSE TOTAL SHOULDER REPLACEMENT  POST-OPERATIVE REHABILITATION PROGRAM  The goal of the rehabilitation process is to provide greater joint stability to the patient, while decreasing their pain and  improving their functional status. The goal of the surgery & rehab (bone loss, muscle loss) is joint stability and less joint  mobility. The key to the success of the rehabilitation following shoulder replacement is compliance to your exercise  program.  Precautions: Should be implemented for the first 12 weeks following surgery- unless the surgeon specifically advises the  patient differently.  · No shoulder motion behind back ( back pocket motion)  · No excessive shoulder horizontal abduction  · No  active external rotation behind head or neck  · No shoulder extension beyond the body  I. PHASE ONE - IMMEDIATE PROTECTED MOTION PHASE (Week 0-6)  Goals: Allow early healing of capsule  Restore passive range of motion  Decrease shoulder pain  Retard muscular atrophy  Patient education  Weeks 0-2  Exercises:   Sling during day and at night (worn for 4 weeks)  Continuous Passive Motion (CPM)   Passive Range of Motion  a. Flexion (0-60 degrees)  b. ER (at 30 degrees Abduction) 0 degrees  c. IR (at 30 degrees Abduction) 30 degrees   Pendulum Exercises   No active shoulder motion   Elbow/Wrist AROM   Gripping Exercises   Isometrics  a. Abductors  b. ER/IR   Cryotherapy for pain   When laying supine use pillow under arm to support glenohumeral joint  Weeks 3-4 (2/11/25 4 weeks)    Continue sling as needed   Continue PROM  a. Progress flexion to 90 degrees  b. ER/IR at 30 degrees abd scapular plane   May initiate AAROM IR/ER   Pendulum exercise   Rope and pulley week 3 to 4   Continue isometric  a. Initiate rhythmic stabilization drills   Continue use of ice  II. PHASE TWO - ACTIVE MOTION PHASE (Week 6-12)  Goals: Improve Shoulder Strength  Gradually progress Active/Passive Range of Motion  Decrease Pain/Inflammation  Increase Functional Activities  Do not over stress healing tissue  Weeks 6-8  Exercises:   Progress PROM  a. Flexion to  degrees  b. ER/IR at 45 degrees abduction scapular plane  c. IR   Progress AAROM ER/IR at 45 degrees abd   Do not aggressively push ROM into ER   Continue rope and pulley to tolerance  Pendulum exercises   Continue isometrics  a. ER/IR  b. Rhythmic stabilization  c. Initiate deltoid flexion/ext   Ice as needed         -Flexion (0-60 degrees)  - ER (at 30 degrees Abduction) 0 degrees  - IR (at 30 degrees Abduction) 30 degrees   Progress  - flexion to 90 degrees  - ER/IR at 30 degrees abd scapular plane  - May initiate AAROM IR/ER                Manuals 2/3 2/17       PROM stretch  JZ -  "within protocol limits                                  Neuro Re-Ed 2/3 2/17                                                                      Ther Ex 2/3 2/17       Wrist flex/ext 10x  10x       Elbow flex/ext  10x  10x       Pendulums flex, abd, circumduction cw, ccw 10x flex/ext   10x horiz abd/add Reviewed form       Pulley flexion standing   5\" 2x10       Supine AAROM wand ER   5\" 2x10                 Educated on HEP 10'  10'                Ther Activity 2/3 2/17                                                                                      "

## 2025-02-19 ENCOUNTER — APPOINTMENT (OUTPATIENT)
Dept: PHYSICAL THERAPY | Facility: CLINIC | Age: 55
End: 2025-02-19
Payer: MEDICARE

## 2025-02-19 ENCOUNTER — OFFICE VISIT (OUTPATIENT)
Dept: OBGYN CLINIC | Facility: CLINIC | Age: 55
End: 2025-02-19

## 2025-02-19 ENCOUNTER — APPOINTMENT (OUTPATIENT)
Dept: RADIOLOGY | Facility: AMBULARY SURGERY CENTER | Age: 55
End: 2025-02-19
Attending: STUDENT IN AN ORGANIZED HEALTH CARE EDUCATION/TRAINING PROGRAM
Payer: MEDICARE

## 2025-02-19 VITALS — WEIGHT: 159 LBS | BODY MASS INDEX: 27.14 KG/M2 | HEIGHT: 64 IN

## 2025-02-19 DIAGNOSIS — S42.216A CLOSED NONDISPLACED FRACTURE OF SURGICAL NECK OF HUMERUS, UNSPECIFIED FRACTURE MORPHOLOGY, UNSPECIFIED LATERALITY, INITIAL ENCOUNTER: ICD-10-CM

## 2025-02-19 DIAGNOSIS — S42.211A CLOSED DISPLACED FRACTURE OF SURGICAL NECK OF RIGHT HUMERUS, UNSPECIFIED FRACTURE MORPHOLOGY, INITIAL ENCOUNTER: Primary | ICD-10-CM

## 2025-02-19 PROCEDURE — 99024 POSTOP FOLLOW-UP VISIT: CPT | Performed by: STUDENT IN AN ORGANIZED HEALTH CARE EDUCATION/TRAINING PROGRAM

## 2025-02-19 PROCEDURE — 73030 X-RAY EXAM OF SHOULDER: CPT

## 2025-02-19 RX ORDER — ALBUTEROL SULFATE 90 UG/1
2 INHALANT RESPIRATORY (INHALATION) EVERY 4 HOURS PRN
COMMUNITY
Start: 2024-12-05

## 2025-02-19 RX ORDER — TRAMADOL HYDROCHLORIDE 50 MG/1
50 TABLET ORAL 3 TIMES DAILY PRN
COMMUNITY
Start: 2025-02-04

## 2025-02-19 RX ORDER — DICYCLOMINE HYDROCHLORIDE 10 MG/1
1 CAPSULE ORAL DAILY
COMMUNITY

## 2025-02-19 NOTE — PROGRESS NOTES
Shoulder Post Operative Visit     Assesment:     54 y.o. female 5 weeks s/p Right Reverse Total Shoulder Arthroplasty for fracture performed on 1/14/2025, with continued axillary nerve palsy     Plan:  The patient's diagnosis and treatment were discussed at length today. We discussed no treatment, non-operative treatment, and operative treatment.    Sarah presents today 5 weeks status post right reverse total shoulder arthroplasty for fracture performed on 1/14/2025 with continued axillary nerve palsy.  I discussed with her that I would like her to continue outpatient physical therapy according to protocol.  She is able to discontinue use of her sling.  During today's visit she does present with decreased sensation over the lateral deltoid and limited deltoid firing.  I discussed with her this is attributed to her axillary nerve palsy.  I discussed with her that if in approximately 5-6 more weeks when she is 3 months postoperatively if her symptoms worsen or remain unchanged we can obtain an EMG for evaluation of her nerve.  She can continue ice and over-the-counter medication as needed for pain relief.  She is to follow-up in approximately 6 weeks for reevaluation.    Post-Operative treatment:    Ice to shoulder 1-2 times daily, for 20 minutes at a time.  Continue outpatient PT.  Discussed if nerve symptoms persist we may consider an EMG at 3 months post-op.    Imaging:    All imaging from today was reviewed by myself and explained to the patient.   We will obtain xrays of the shoulder on the next visit.  X-rays performed at today's visit were reviewed which demonstrate stable appearance of reverse total shoulder arthroplasty.    Sling:  never, as they have completed this portion of the post op period.    DVT Prophylaxis:  Ambulation    Follow up:   6 weeks    Patient was advised that if they have any fevers, chills, chest pain, shortness of breath, redness or drainage from the incision, please let our office know  "immediately.        Chief Complaint   Patient presents with    Right Shoulder - Fracture, Pain, Post-op     Patient states she has a awful burning pain        History of Present Illness:    The patient is a 54 y.o. female who is being evaluated post operatively 5 weeks  status post right reverse total shoulder arthroplasty for fracture with axillary nerve palsy performed on 1/14/2025.  She states that she continues to exhibit anterior shoulder pain with an itching sensation as the surgical glue is still covering her incision.  She mentions that she has begun to gradually discontinue use of her sling.  She reports that she has been semicompliant with outpatient physical therapy attending 2 visits thus far.  She mentions that the lateral aspect of her arm is still slightly numb.  She denies any new injury or trauma.  She denies any fever or chills.     I have reviewed the past medical, surgical, social and family history, medications and allergies as documented in the EMR.    Review of systems: ROS is negative other than that noted in the HPI.  Constitutional: Negative for fatigue and fever.       Physical Exam:    Height 5' 3.5\" (1.613 m), weight 72.1 kg (159 lb).    General/Constitutional: NAD, well developed, well nourished  HENT: Normocephalic, atraumatic  CV: Intact distal pulses, regular rate  Resp: No respiratory distress or labored breathing  Lymphatic: No lymphadenopathy palpated  Neuro: Alert and Oriented x 3, no focal deficits  Psych: Normal mood, normal affect, normal judgement, normal behavior  Skin: Warm, dry, no rashes, no erythema    Shoulder focused exam:        Visual inspection of the right shoulder demonstrates normal contour.  Incision continues to heal well with no erythema or drainage.  Compartments are soft and compressible capillary fill is brisk.  Diminished sensation over the medial deltoid, hypersensitivity to touch over the anterior deltoid, normal sensation over the posterior " deltoid.  Intact radial nerve.  Unable to perform active range of motion.  Passive range of motion with forward flexion to 80 degrees, abduction to 60, external rotation to 20 with the arm at the side, internal Tatian not tested      UE NV Exam: +2 Radial pulses bilaterally  Sensation intact to light touch C5-T1 bilaterally, Radial/median/ulnar nerve motor intact    Radiographs of the right shoulder demonstrate status post reverse shoulder arthroplasty for fracture.  There is some continued superior displacement of her greater tuberosity.  Overall stem and glenosphere position are stable.  I do not currently have radiologist interpretation.  There is minimal interval callus formation along the comminuted butterfly fragment.    Scribe Attestation      I,:  Walter Meza am acting as a scribe while in the presence of the attending physician.:       I,:  Sid Low, DO personally performed the services described in this documentation    as scribed in my presence.:

## 2025-02-24 ENCOUNTER — OFFICE VISIT (OUTPATIENT)
Dept: PHYSICAL THERAPY | Facility: CLINIC | Age: 55
End: 2025-02-24
Payer: MEDICARE

## 2025-02-24 DIAGNOSIS — S42.211A CLOSED DISPLACED FRACTURE OF SURGICAL NECK OF RIGHT HUMERUS, UNSPECIFIED FRACTURE MORPHOLOGY, INITIAL ENCOUNTER: ICD-10-CM

## 2025-02-24 DIAGNOSIS — S42.211A CLOSED DISPLACED FRACTURE OF SURGICAL NECK OF RIGHT HUMERUS, INITIAL ENCOUNTER: Primary | ICD-10-CM

## 2025-02-24 PROCEDURE — 97140 MANUAL THERAPY 1/> REGIONS: CPT | Performed by: PHYSICAL THERAPIST

## 2025-02-24 PROCEDURE — 97110 THERAPEUTIC EXERCISES: CPT | Performed by: PHYSICAL THERAPIST

## 2025-02-24 NOTE — PROGRESS NOTES
Daily Note     Today's date: 2025  Patient name: Sarah Fisher  : 1970  MRN: [id number]  Referring provider: Rossy Franco PA-C  Dx:   Encounter Diagnosis     ICD-10-CM    1. Closed displaced fracture of surgical neck of right humerus, initial encounter  S42.211A       2. Closed displaced fracture of surgical neck of right humerus, unspecified fracture morphology, initial encounter  S42.211A              Subjective: Pt reported that she had a follow up with her surgeon. She stated that the doctor stated that she should be starting to use her arm for all of her functional tasks. She noted that she has not been able to keep up with her pulley HEP because the pulleys have not come in the main yet. She stated that since receiving clearance from the doctor for functional tasks, she has been using her arm to pour a gallon of water, baking, and with washing dishes. Noted that the tasks are occasionally painful.     Objective: See treatment diary below    Assessment: Tolerated treatment well. Advanced exercises within protocol limits well without increase in pain. Noted improved flexibility with manual PROM stretches. Patient would benefit from continued PT    Plan: Continue per plan of care.      Precautions:   History of cervical radiculopathy    Sling till: 25  DOS: 25  REVERSE TOTAL SHOULDER REPLACEMENT  POST-OPERATIVE REHABILITATION PROGRAM  The goal of the rehabilitation process is to provide greater joint stability to the patient, while decreasing their pain and  improving their functional status. The goal of the surgery & rehab (bone loss, muscle loss) is joint stability and less joint  mobility. The key to the success of the rehabilitation following shoulder replacement is compliance to your exercise  program.  Precautions: Should be implemented for the first 12 weeks following surgery- unless the surgeon specifically advises the  patient differently.  · No shoulder motion behind back ( back  pocket motion)  · No excessive shoulder horizontal abduction  · No active external rotation behind head or neck  · No shoulder extension beyond the body  I. PHASE ONE - IMMEDIATE PROTECTED MOTION PHASE (Week 0-6)  Goals: Allow early healing of capsule  Restore passive range of motion  Decrease shoulder pain  Retard muscular atrophy  Patient education  Weeks 0-2  Exercises:   Sling during day and at night (worn for 4 weeks)  Continuous Passive Motion (CPM)   Passive Range of Motion  a. Flexion (0-60 degrees)  b. ER (at 30 degrees Abduction) 0 degrees  c. IR (at 30 degrees Abduction) 30 degrees   Pendulum Exercises   No active shoulder motion   Elbow/Wrist AROM   Gripping Exercises   Isometrics  a. Abductors  b. ER/IR   Cryotherapy for pain   When laying supine use pillow under arm to support glenohumeral joint  Weeks 3-4 (2/11/25 4 weeks)    Continue sling as needed   Continue PROM  a. Progress flexion to 90 degrees  b. ER/IR at 30 degrees abd scapular plane   May initiate AAROM IR/ER   Pendulum exercise   Rope and pulley week 3 to 4   Continue isometric  a. Initiate rhythmic stabilization drills   Continue use of ice  II. PHASE TWO - ACTIVE MOTION PHASE (Week 6-12) 2/25-  Goals: Improve Shoulder Strength  Gradually progress Active/Passive Range of Motion  Decrease Pain/Inflammation  Increase Functional Activities  Do not over stress healing tissue  Weeks 6-8 2/25-3/11  Exercises:   Progress PROM  a. Flexion to  degrees  b. ER/IR at 45 degrees abduction scapular plane  c. IR   Progress AAROM ER/IR at 45 degrees abd   Do not aggressively push ROM into ER   Continue rope and pulley to tolerance  Pendulum exercises   Continue isometrics  a. ER/IR  b. Rhythmic stabilization  c. Initiate deltoid flexion/ext   Ice as needed         -Flexion (0-60 degrees)  - ER (at 30 degrees Abduction) 0 degrees  - IR (at 30 degrees Abduction) 30 degrees   Progress  - flexion to 90 degrees  - ER/IR at 30 degrees abd scapular  "plane  - May initiate AAROM IR/ER                Manuals 2/3 2/17 2/24      PROM stretch  JZ - within protocol limits JZ - within protocol limits                                 Neuro Re-Ed 2/3 2/17 2/24                                                                     Ther Ex 2/3 2/17 2/24      Wrist flex/ext 10x  10x       Elbow flex/ext  10x  10x       Pendulums flex, abd, circumduction cw, ccw 10x flex/ext   10x horiz abd/add Reviewed form       Pulley flexion standing   5\" 2x10 Scaption  and flexion  5\" 2x10      Supine AAROM wand ER   5\" 2x10        Supine b/l sh flexion AAROM   2x10       Supine chest press   4x5       Stand tricep ext   Red 3x10       Educated on HEP 10'  10' 10'               Ther Activity 2/3 2/17 2/24                                                                                       "

## 2025-02-26 ENCOUNTER — OFFICE VISIT (OUTPATIENT)
Dept: PHYSICAL THERAPY | Facility: CLINIC | Age: 55
End: 2025-02-26
Payer: MEDICARE

## 2025-02-26 DIAGNOSIS — S42.211A CLOSED DISPLACED FRACTURE OF SURGICAL NECK OF RIGHT HUMERUS, UNSPECIFIED FRACTURE MORPHOLOGY, INITIAL ENCOUNTER: ICD-10-CM

## 2025-02-26 DIAGNOSIS — S42.211A CLOSED DISPLACED FRACTURE OF SURGICAL NECK OF RIGHT HUMERUS, INITIAL ENCOUNTER: Primary | ICD-10-CM

## 2025-02-26 PROCEDURE — 97110 THERAPEUTIC EXERCISES: CPT | Performed by: PHYSICAL THERAPIST

## 2025-02-26 PROCEDURE — 97140 MANUAL THERAPY 1/> REGIONS: CPT | Performed by: PHYSICAL THERAPIST

## 2025-02-26 NOTE — PROGRESS NOTES
"Daily Note     Today's date: 2025  Patient name: Sarah Fisher  : 1970  MRN: [id number]  Referring provider: Rossy Franco PA-C  Dx:   Encounter Diagnosis     ICD-10-CM    1. Closed displaced fracture of surgical neck of right humerus, initial encounter  S42.211A       2. Closed displaced fracture of surgical neck of right humerus, unspecified fracture morphology, initial encounter  S42.211A              Subjective: Pt reported that she is feeling \"really sore today.\" She attributed increase in pain to picking up \"gallon water jug, which really hurt.\" She also stated \"It could hurt because I have just been doing to much at home.\" Noted that she has been preparing meals and other household chores, which \"usually hurt my shoulder.\"     Objective: See treatment diary below    Assessment: Tolerated treatment fair. Advanced below shoulder level strengthening per protocol well without provocation of pain. Demonstrated improved PROM with manual stretches. Patient would benefit from continued PT    Plan: Continue per plan of care.      Precautions:   History of cervical radiculopathy    Sling till: 25  DOS: 25  REVERSE TOTAL SHOULDER REPLACEMENT  POST-OPERATIVE REHABILITATION PROGRAM  The goal of the rehabilitation process is to provide greater joint stability to the patient, while decreasing their pain and  improving their functional status. The goal of the surgery & rehab (bone loss, muscle loss) is joint stability and less joint  mobility. The key to the success of the rehabilitation following shoulder replacement is compliance to your exercise  program.  Precautions: Should be implemented for the first 12 weeks following surgery- unless the surgeon specifically advises the  patient differently.  · No shoulder motion behind back ( back pocket motion)  · No excessive shoulder horizontal abduction  · No active external rotation behind head or neck  · No shoulder extension beyond the body  I. PHASE " ONE - IMMEDIATE PROTECTED MOTION PHASE (Week 0-6)  Goals: Allow early healing of capsule  Restore passive range of motion  Decrease shoulder pain  Retard muscular atrophy  Patient education  Weeks 0-2  Exercises:   Sling during day and at night (worn for 4 weeks)  Continuous Passive Motion (CPM)   Passive Range of Motion  a. Flexion (0-60 degrees)  b. ER (at 30 degrees Abduction) 0 degrees  c. IR (at 30 degrees Abduction) 30 degrees   Pendulum Exercises   No active shoulder motion   Elbow/Wrist AROM   Gripping Exercises   Isometrics  a. Abductors  b. ER/IR   Cryotherapy for pain   When laying supine use pillow under arm to support glenohumeral joint  Weeks 3-4 (2/11/25 4 weeks)    Continue sling as needed   Continue PROM  a. Progress flexion to 90 degrees  b. ER/IR at 30 degrees abd scapular plane   May initiate AAROM IR/ER   Pendulum exercise   Rope and pulley week 3 to 4   Continue isometric  a. Initiate rhythmic stabilization drills   Continue use of ice  II. PHASE TWO - ACTIVE MOTION PHASE (Week 6-12) 2/25-  Goals: Improve Shoulder Strength  Gradually progress Active/Passive Range of Motion  Decrease Pain/Inflammation  Increase Functional Activities  Do not over stress healing tissue  Weeks 6-8 2/25-3/11  Exercises:   Progress PROM  a. Flexion to  degrees  b. ER/IR at 45 degrees abduction scapular plane  c. IR   Progress AAROM ER/IR at 45 degrees abd   Do not aggressively push ROM into ER   Continue rope and pulley to tolerance  Pendulum exercises   Continue isometrics  a. ER/IR  b. Rhythmic stabilization  c. Initiate deltoid flexion/ext   Ice as needed                        Manuals 2/3 2/17 2/24 2/26     PROM stretch  JZ - within protocol limits JZ - within protocol limits JZ - within protocol limits                                Neuro Re-Ed 2/3 2/17 2/24 2/26                                                  Ther Ex 2/3 2/17 2/24 2/26     Flexbar supination    Red 2x10 ea     Elbow flex/ext  10x  10x   "Bicep curl 1# 2x10      Pendulums flex, abd, circumduction cw, ccw 10x flex/ext   10x horiz abd/add Reviewed form       Pulley flexion standing   5\" 2x10 Scaption  and flexion  5\" 2x10 Scaption  and flexion  5\" 2x10     Supine AAROM  ER   5\" 2x10   5\" 2x10     Supine b/l sh flexion AAROM   2x10  5\" 2x10     Supine chest press   AAROM 4x5  AAROM 2x10     Stand tricep ext   Red 3x10  Red 3x10      Educated on HEP 10'  10' 10'               Ther Activity 2/3 2/17 2/24 2/26                                                                                        "

## 2025-03-03 ENCOUNTER — OFFICE VISIT (OUTPATIENT)
Dept: PHYSICAL THERAPY | Facility: CLINIC | Age: 55
End: 2025-03-03
Payer: MEDICARE

## 2025-03-03 DIAGNOSIS — S42.211A CLOSED DISPLACED FRACTURE OF SURGICAL NECK OF RIGHT HUMERUS, INITIAL ENCOUNTER: Primary | ICD-10-CM

## 2025-03-03 DIAGNOSIS — S42.211A CLOSED DISPLACED FRACTURE OF SURGICAL NECK OF RIGHT HUMERUS, UNSPECIFIED FRACTURE MORPHOLOGY, INITIAL ENCOUNTER: ICD-10-CM

## 2025-03-03 PROCEDURE — 97110 THERAPEUTIC EXERCISES: CPT | Performed by: PHYSICAL THERAPIST

## 2025-03-03 PROCEDURE — 97140 MANUAL THERAPY 1/> REGIONS: CPT | Performed by: PHYSICAL THERAPIST

## 2025-03-03 NOTE — PROGRESS NOTES
Daily Note     Today's date: 3/3/2025  Patient name: Sarah Fisher  : 1970  MRN: [id number]  Referring provider: Rossy Franco PA-C  Dx:   Encounter Diagnosis     ICD-10-CM    1. Closed displaced fracture of surgical neck of right humerus, initial encounter  S42.211A       2. Closed displaced fracture of surgical neck of right humerus, unspecified fracture morphology, initial encounter  S42.211A              Subjective: Pt reported that she is improving slowly with her motion, but is frustrated with how slow progress is coming along. Noted that she ordered the wrong part for doing pulleys in her HEP, ordered a band instead of pulleys.     Objective: See treatment diary below    Assessment: Tolerated treatment well. Demonstrated improved motion with PROM stretch. Presents with significant difficulty with performing shoulder ER due to weakness. Patient demonstrated fatigue post treatment, exhibited good technique with therapeutic exercises, and would benefit from continued PT    Plan: Continue per plan of care.      Precautions:   History of cervical radiculopathy    Sling till: 25  DOS: 25  REVERSE TOTAL SHOULDER REPLACEMENT  POST-OPERATIVE REHABILITATION PROGRAM  The goal of the rehabilitation process is to provide greater joint stability to the patient, while decreasing their pain and  improving their functional status. The goal of the surgery & rehab (bone loss, muscle loss) is joint stability and less joint  mobility. The key to the success of the rehabilitation following shoulder replacement is compliance to your exercise  program.  Precautions: Should be implemented for the first 12 weeks following surgery- unless the surgeon specifically advises the  patient differently.  · No shoulder motion behind back ( back pocket motion)  · No excessive shoulder horizontal abduction  · No active external rotation behind head or neck  · No shoulder extension beyond the body  I. PHASE ONE - IMMEDIATE  PROTECTED MOTION PHASE (Week 0-6)  Goals: Allow early healing of capsule  Restore passive range of motion  Decrease shoulder pain  Retard muscular atrophy  Patient education  Weeks 0-2  Exercises:   Sling during day and at night (worn for 4 weeks)  Continuous Passive Motion (CPM)   Passive Range of Motion  a. Flexion (0-60 degrees)  b. ER (at 30 degrees Abduction) 0 degrees  c. IR (at 30 degrees Abduction) 30 degrees   Pendulum Exercises   No active shoulder motion   Elbow/Wrist AROM   Gripping Exercises   Isometrics  a. Abductors  b. ER/IR   Cryotherapy for pain   When laying supine use pillow under arm to support glenohumeral joint  Weeks 3-4 (2/11/25 4 weeks)    Continue sling as needed   Continue PROM  a. Progress flexion to 90 degrees  b. ER/IR at 30 degrees abd scapular plane   May initiate AAROM IR/ER   Pendulum exercise   Rope and pulley week 3 to 4   Continue isometric  a. Initiate rhythmic stabilization drills   Continue use of ice  II. PHASE TWO - ACTIVE MOTION PHASE (Week 6-12) 2/25-  Goals: Improve Shoulder Strength  Gradually progress Active/Passive Range of Motion  Decrease Pain/Inflammation  Increase Functional Activities  Do not over stress healing tissue  Weeks 6-8 2/25-3/11  Exercises:   Progress PROM  a. Flexion to  degrees  b. ER/IR at 45 degrees abduction scapular plane  c. IR   Progress AAROM ER/IR at 45 degrees abd   Do not aggressively push ROM into ER   Continue rope and pulley to tolerance  Pendulum exercises   Continue isometrics  a. ER/IR  b. Rhythmic stabilization  c. Initiate deltoid flexion/ext   Ice as needed                        Manuals 2/3 2/17 2/24 2/26 3/3    PROM stretch  JZ - within protocol limits JZ - within protocol limits JZ - within protocol limits JZ JZ - within protocol limits                               Neuro Re-Ed 2/3 2/17 2/24 2/26 3/3                                                 Ther Ex 2/3 2/17 2/24 2/26 3/3    Flexbar supination    Red 2x10 ea Red 2x10  "    Elbow flex/ext  10x  10x  Bicep curl 1# 2x10  Bicep curl  1# 2x10    Pendulums flex, abd, circumduction cw, ccw 10x flex/ext   10x horiz abd/add Reviewed form       Pulley flexion standing   5\" 2x10 Scaption  and flexion  5\" 2x10 Scaption  and flexion  5\" 2x10 2\" 2x10 ea    Supine AAROM  ER   5\" 2x10   5\" 2x10 2x10     Supine b/l sh flexion AAROM   2x10  5\" 2x10 2x10     Supine chest press   AAROM 4x5  AAROM 2x10     Stand tricep ext   Red 3x10  Red 3x10      Seated sh ER          Isometric sh flexion     2\" 20x     Isometric sh abduction     2\" 20x     Educated on HEP 10'  10' 10'               Ther Activity 2/3 2/17 2/24 2/26 3/3                                                                                         "

## 2025-03-04 ENCOUNTER — TELEPHONE (OUTPATIENT)
Age: 55
End: 2025-03-04

## 2025-03-04 DIAGNOSIS — S42.211A CLOSED DISPLACED FRACTURE OF SURGICAL NECK OF RIGHT HUMERUS, UNSPECIFIED FRACTURE MORPHOLOGY, INITIAL ENCOUNTER: Primary | ICD-10-CM

## 2025-03-04 DIAGNOSIS — S42.211A CLOSED DISPLACED FRACTURE OF SURGICAL NECK OF RIGHT HUMERUS, UNSPECIFIED FRACTURE MORPHOLOGY, INITIAL ENCOUNTER: ICD-10-CM

## 2025-03-04 RX ORDER — NAPROXEN 500 MG/1
500 TABLET ORAL 2 TIMES DAILY WITH MEALS
Qty: 28 TABLET | Refills: 0 | Status: SHIPPED | OUTPATIENT
Start: 2025-03-04 | End: 2025-03-04 | Stop reason: SDUPTHER

## 2025-03-04 RX ORDER — ACETAMINOPHEN 500 MG
500 TABLET ORAL EVERY 6 HOURS PRN
Qty: 60 TABLET | Refills: 0 | Status: SHIPPED | OUTPATIENT
Start: 2025-03-04

## 2025-03-04 RX ORDER — NAPROXEN 500 MG/1
500 TABLET ORAL 2 TIMES DAILY WITH MEALS
Qty: 28 TABLET | Refills: 0 | Status: SHIPPED | OUTPATIENT
Start: 2025-03-04 | End: 2025-03-18

## 2025-03-04 RX ORDER — ACETAMINOPHEN 500 MG
500 TABLET ORAL EVERY 6 HOURS PRN
Qty: 60 TABLET | Refills: 0 | Status: SHIPPED | OUTPATIENT
Start: 2025-03-04 | End: 2025-03-04 | Stop reason: SDUPTHER

## 2025-03-04 NOTE — TELEPHONE ENCOUNTER
Caller: Patient     Doctor: Dr. Low     Reason for call: Patient is s/p right reverse shoulder arthroplasty, sx 1/14/25. She states that she is still having a lot of swelling in the under arm area/bicep and she is asking if she should use ice or something else. She is also requesting med refill for the Naproxen and Tylenol. She is using Tramadol from another doctor as well. Also, she is asking if there is something she can use on the incision for the tight feeling.   Please advise     Call back#: [phone]

## 2025-03-04 NOTE — TELEPHONE ENCOUNTER
"Spoke to patient. Her swelling is improving however she still has some. I advised this should continue to improve over time. She can certainly ice the shoulder.   She notes still can't lift her arm much. I advised that this is a lengthy recovery and is not uncommon at this point post op. She will continue to work hard with PT.   She also complains that her incision feels \"tight\". She notes it is well healed and that her therapist also said it looked very well healed. I advised they can start some very gentle scar  massage at this point.   Patient is weaning from naproxen and tylenol, but is asking for a refill of both. Refills given.  She is also still taking tramadol from another provider. Pain is slowly improving. No erythema fever/chills.  All questions answered.     "

## 2025-03-05 ENCOUNTER — OFFICE VISIT (OUTPATIENT)
Dept: PHYSICAL THERAPY | Facility: CLINIC | Age: 55
End: 2025-03-05
Payer: MEDICARE

## 2025-03-05 DIAGNOSIS — S42.211A CLOSED DISPLACED FRACTURE OF SURGICAL NECK OF RIGHT HUMERUS, INITIAL ENCOUNTER: Primary | ICD-10-CM

## 2025-03-05 DIAGNOSIS — S42.211A CLOSED DISPLACED FRACTURE OF SURGICAL NECK OF RIGHT HUMERUS, UNSPECIFIED FRACTURE MORPHOLOGY, INITIAL ENCOUNTER: ICD-10-CM

## 2025-03-05 PROCEDURE — 97140 MANUAL THERAPY 1/> REGIONS: CPT | Performed by: PHYSICAL THERAPIST

## 2025-03-05 PROCEDURE — 97112 NEUROMUSCULAR REEDUCATION: CPT | Performed by: PHYSICAL THERAPIST

## 2025-03-05 PROCEDURE — 97110 THERAPEUTIC EXERCISES: CPT | Performed by: PHYSICAL THERAPIST

## 2025-03-05 NOTE — PROGRESS NOTES
Daily Note     Today's date: 3/5/2025  Patient name: Sarah Fisher  : 1970  MRN: [id number]  Referring provider: Rossy Franco PA-C  Dx:   Encounter Diagnosis     ICD-10-CM    1. Closed displaced fracture of surgical neck of right humerus, initial encounter  S42.211A       2. Closed displaced fracture of surgical neck of right humerus, unspecified fracture morphology, initial encounter  S42.211A              Subjective: Pt reported that after her last visit she noticed a significant improvement in her shoulder motion. She also called her physician for  regarding scar care. She was instructed to refrain from using lotions, but to perform scar massage.      Objective: See treatment diary below    Assessment: Tolerated treatment well. Initiated scapular strengthening well without pain. Patient demonstrated fatigue post treatment, exhibited good technique with therapeutic exercises, and would benefit from continued PT    Plan: Continue per plan of care.      Precautions:   History of cervical radiculopathy    Sling till: 25  DOS: 25  REVERSE TOTAL SHOULDER REPLACEMENT  POST-OPERATIVE REHABILITATION PROGRAM  The goal of the rehabilitation process is to provide greater joint stability to the patient, while decreasing their pain and  improving their functional status. The goal of the surgery & rehab (bone loss, muscle loss) is joint stability and less joint  mobility. The key to the success of the rehabilitation following shoulder replacement is compliance to your exercise  program.  Precautions: Should be implemented for the first 12 weeks following surgery- unless the surgeon specifically advises the  patient differently.  · No shoulder motion behind back ( back pocket motion)  · No excessive shoulder horizontal abduction  · No active external rotation behind head or neck  · No shoulder extension beyond the body  I. PHASE ONE - IMMEDIATE PROTECTED MOTION PHASE (Week 0-6)  Goals: Allow early  healing of capsule  Restore passive range of motion  Decrease shoulder pain  Retard muscular atrophy  Patient education  Weeks 0-2  Exercises:   Sling during day and at night (worn for 4 weeks)  Continuous Passive Motion (CPM)   Passive Range of Motion  a. Flexion (0-60 degrees)  b. ER (at 30 degrees Abduction) 0 degrees  c. IR (at 30 degrees Abduction) 30 degrees   Pendulum Exercises   No active shoulder motion   Elbow/Wrist AROM   Gripping Exercises   Isometrics  a. Abductors  b. ER/IR   Cryotherapy for pain   When laying supine use pillow under arm to support glenohumeral joint  Weeks 3-4 (2/11/25 4 weeks)    Continue sling as needed   Continue PROM  a. Progress flexion to 90 degrees  b. ER/IR at 30 degrees abd scapular plane   May initiate AAROM IR/ER   Pendulum exercise   Rope and pulley week 3 to 4   Continue isometric  a. Initiate rhythmic stabilization drills   Continue use of ice  II. PHASE TWO - ACTIVE MOTION PHASE (Week 6-12) 2/25-  Goals: Improve Shoulder Strength  Gradually progress Active/Passive Range of Motion  Decrease Pain/Inflammation  Increase Functional Activities  Do not over stress healing tissue  Weeks 6-8 2/25-3/11  Exercises:   Progress PROM  a. Flexion to  degrees  b. ER/IR at 45 degrees abduction scapular plane  c. IR   Progress AAROM ER/IR at 45 degrees abd   Do not aggressively push ROM into ER   Continue rope and pulley to tolerance  Pendulum exercises   Continue isometrics  a. ER/IR  b. Rhythmic stabilization  c. Initiate deltoid flexion/ext   Ice as needed                          Manuals 2/3 2/17 2/24 2/26 3/3 3/5    PROM stretch  JZ - within protocol limits JZ - within protocol limits JZ - within protocol limits JZ JZ - within protocol limits JZ within ROM restriction                                  Neuro Re-Ed 2/3 2/17 2/24 2/26 3/3 3/5    Row scap retract      Blue 3x10    SH ext scap retract      Red 3x10                                   Ther Ex 2/3 2/17 2/24 2/26 3/3  "3/5    Flexbar supination    Red 2x10 ea Red 2x10  Red 2x10     Elbow flex/ext  10x  10x  Bicep curl 1# 2x10  Bicep curl  1# 2x10 Bicep curl  1# 10x  2# 2x10              Pulley flexion standing   5\" 2x10 Scaption  and flexion  5\" 2x10 Scaption  and flexion  5\" 2x10 2\" 2x10 ea     Supine AAROM  ER   5\" 2x10   5\" 2x10 2x10  2x10    Supine b/l sh flexion AAROM   2x10  5\" 2x10 2x10  2x10    Supine chest press   AAROM 4x5  AAROM 2x10      Stand tricep ext   Red 3x10  Red 3x10   Red 2x10   Blue 2x10    Band shoulder adduction          Isometric sh flexion     2\" 20x      Isometric sh abduction     2\" 20x      Educated on HEP 10'  10' 10'                 Ther Activity 2/3 2/17 2/24 2/26 3/3 3/5                                                                                                    "

## 2025-03-10 ENCOUNTER — APPOINTMENT (OUTPATIENT)
Dept: PHYSICAL THERAPY | Facility: CLINIC | Age: 55
End: 2025-03-10
Payer: MEDICARE

## 2025-03-12 ENCOUNTER — APPOINTMENT (OUTPATIENT)
Dept: PHYSICAL THERAPY | Facility: CLINIC | Age: 55
End: 2025-03-12
Payer: MEDICARE

## 2025-03-17 ENCOUNTER — OFFICE VISIT (OUTPATIENT)
Dept: PHYSICAL THERAPY | Facility: CLINIC | Age: 55
End: 2025-03-17
Payer: MEDICARE

## 2025-03-17 DIAGNOSIS — S42.211A CLOSED DISPLACED FRACTURE OF SURGICAL NECK OF RIGHT HUMERUS, INITIAL ENCOUNTER: Primary | ICD-10-CM

## 2025-03-17 DIAGNOSIS — S42.211A CLOSED DISPLACED FRACTURE OF SURGICAL NECK OF RIGHT HUMERUS, UNSPECIFIED FRACTURE MORPHOLOGY, INITIAL ENCOUNTER: ICD-10-CM

## 2025-03-17 PROCEDURE — 97140 MANUAL THERAPY 1/> REGIONS: CPT | Performed by: PHYSICAL THERAPIST

## 2025-03-17 PROCEDURE — 97112 NEUROMUSCULAR REEDUCATION: CPT | Performed by: PHYSICAL THERAPIST

## 2025-03-17 PROCEDURE — 97110 THERAPEUTIC EXERCISES: CPT | Performed by: PHYSICAL THERAPIST

## 2025-03-17 NOTE — PROGRESS NOTES
"Daily Note     Today's date: 3/17/2025  Patient name: Sarah Fisher  : 1970  MRN: [id number]  Referring provider: Rossy Franco PA-C  Dx:   Encounter Diagnosis     ICD-10-CM    1. Closed displaced fracture of surgical neck of right humerus, initial encounter  S42.211A       2. Closed displaced fracture of surgical neck of right humerus, unspecified fracture morphology, initial encounter  S42.211A              Subjective: Pt reported that she has been doing her HEP inconsistently. Noted \"I thought I might have hurt it over the weekend.\" She stated that she was on her hands and knees reaching to get something out from under the bed and she felt an onset of pain into her shoulder that lasted a couple days. Notes pain returned to normal levels today.    Objective: See treatment diary below    Assessment: Tolerated treatment well. Advanced strengthening well today without pain. Patient demonstrated fatigue post treatment, exhibited good technique with therapeutic exercises, and would benefit from continued PT    Plan: Continue per plan of care.      Precautions:   History of cervical radiculopathy    Sling till: 25  DOS: 25  REVERSE TOTAL SHOULDER REPLACEMENT  POST-OPERATIVE REHABILITATION PROGRAM  The goal of the rehabilitation process is to provide greater joint stability to the patient, while decreasing their pain and  improving their functional status. The goal of the surgery & rehab (bone loss, muscle loss) is joint stability and less joint  mobility. The key to the success of the rehabilitation following shoulder replacement is compliance to your exercise  program.  Precautions: Should be implemented for the first 12 weeks following surgery- unless the surgeon specifically advises the  patient differently.  · No shoulder motion behind back ( back pocket motion)  · No excessive shoulder horizontal abduction  · No active external rotation behind head or neck  · No shoulder extension beyond the " body  I. PHASE ONE - IMMEDIATE PROTECTED MOTION PHASE (Week 0-6)  Goals: Allow early healing of capsule  Restore passive range of motion  Decrease shoulder pain  Retard muscular atrophy  Patient education  Weeks 0-2  Exercises:   Sling during day and at night (worn for 4 weeks)  Continuous Passive Motion (CPM)   Passive Range of Motion  a. Flexion (0-60 degrees)  b. ER (at 30 degrees Abduction) 0 degrees  c. IR (at 30 degrees Abduction) 30 degrees   Pendulum Exercises   No active shoulder motion   Elbow/Wrist AROM   Gripping Exercises   Isometrics  a. Abductors  b. ER/IR   Cryotherapy for pain   When laying supine use pillow under arm to support glenohumeral joint  Weeks 3-4 (2/11/25 4 weeks)    Continue sling as needed   Continue PROM  a. Progress flexion to 90 degrees  b. ER/IR at 30 degrees abd scapular plane   May initiate AAROM IR/ER   Pendulum exercise   Rope and pulley week 3 to 4   Continue isometric  a. Initiate rhythmic stabilization drills   Continue use of ice  II. PHASE TWO - ACTIVE MOTION PHASE (Week 6-12) 2/25-  Goals: Improve Shoulder Strength  Gradually progress Active/Passive Range of Motion  Decrease Pain/Inflammation  Increase Functional Activities  Do not over stress healing tissue  Weeks 6-8 2/25-3/11  Exercises:   Progress PROM  a. Flexion to  degrees  b. ER/IR at 45 degrees abduction scapular plane  c. IR   Progress AAROM ER/IR at 45 degrees abd   Do not aggressively push ROM into ER   Continue rope and pulley to tolerance  Pendulum exercises   Continue isometrics  a. ER/IR  b. Rhythmic stabilization  c. Initiate deltoid flexion/ext   Ice as needed                          Manuals 2/3 2/17 2/24 2/26 3/3 3/5 3/17   PROM stretch  JZ - within protocol limits JZ - within protocol limits JZ - within protocol limits JZ JZ - within protocol limits JZ within ROM restriction JZ within ROM restriction   Inf glide, AP mobs       Grade II JZ                       Neuro Re-Ed 2/3 2/17 2/24 2/26 3/3  "3/5 3/17   Row scap retract      Blue 3x10 Blue 3x10    SH ext scap retract      Red 3x10  Red 3x10   Scap retract tricep ext       Blue 3x10                       Ther Ex 2/3 2/17 2/24 2/26 3/3 3/5 3/17   Flexbar supination    Red 2x10 ea Red 2x10  Red 2x10  Red 20x    Flexbar extension/extension       Red 20x    Elbow flex/ext  10x  10x  Bicep curl 1# 2x10  Bicep curl  1# 2x10 Bicep curl  1# 10x  2# 2x10 Bicep curl  2# 2x10  3# 2x10   Supination/pronation Tigertail          Brenda flexion standing   5\" 2x10 Scaption  and flexion  5\" 2x10 Scaption  and flexion  5\" 2x10 2\" 2x10 ea  20x 2\" flex/abd   Supine AAROM  ER   5\" 2x10   5\" 2x10 2x10  2x10 2x10   Supine b/l sh flexion AAROM   2x10  5\" 2x10 2x10  2x10 2x10   Supine chest press   AAROM 4x5  AAROM 2x10                Band shoulder adduction          Isometric sh flexion     2\" 20x      Isometric sh abduction     2\" 20x      Educated on HEP 10'  10' 10'                 Ther Activity 2/3 2/17 2/24 2/26 3/3 3/5  3/17                                                                                                    "

## 2025-03-19 ENCOUNTER — OFFICE VISIT (OUTPATIENT)
Dept: PHYSICAL THERAPY | Facility: CLINIC | Age: 55
End: 2025-03-19
Payer: MEDICARE

## 2025-03-19 DIAGNOSIS — S42.211A CLOSED DISPLACED FRACTURE OF SURGICAL NECK OF RIGHT HUMERUS, UNSPECIFIED FRACTURE MORPHOLOGY, INITIAL ENCOUNTER: ICD-10-CM

## 2025-03-19 DIAGNOSIS — S42.211A CLOSED DISPLACED FRACTURE OF SURGICAL NECK OF RIGHT HUMERUS, INITIAL ENCOUNTER: Primary | ICD-10-CM

## 2025-03-19 PROCEDURE — 97110 THERAPEUTIC EXERCISES: CPT | Performed by: PHYSICAL THERAPIST

## 2025-03-19 PROCEDURE — 97140 MANUAL THERAPY 1/> REGIONS: CPT | Performed by: PHYSICAL THERAPIST

## 2025-03-19 PROCEDURE — 97112 NEUROMUSCULAR REEDUCATION: CPT | Performed by: PHYSICAL THERAPIST

## 2025-03-19 NOTE — PROGRESS NOTES
Daily Note     Today's date: 3/19/2025  Patient name: Sarah Fisher  : 1970  MRN: [id number]  Referring provider: Rossy Franco PA-C  Dx:   Encounter Diagnosis     ICD-10-CM    1. Closed displaced fracture of surgical neck of right humerus, initial encounter  S42.211A       2. Closed displaced fracture of surgical neck of right humerus, unspecified fracture morphology, initial encounter  S42.211A              Subjective: Pt reported that she had some muscle soreness following the last session. Noted no increase in pain.     Objective: See treatment diary below    Assessment: Tolerated treatment well. Patient demonstrated fatigue post treatment, exhibited good technique with therapeutic exercises, and would benefit from continued PT    Plan: Continue per plan of care.      Precautions:   History of cervical radiculopathy    Sling till: 25  DOS: 25  REVERSE TOTAL SHOULDER REPLACEMENT  POST-OPERATIVE REHABILITATION PROGRAM  The goal of the rehabilitation process is to provide greater joint stability to the patient, while decreasing their pain and  improving their functional status. The goal of the surgery & rehab (bone loss, muscle loss) is joint stability and less joint  mobility. The key to the success of the rehabilitation following shoulder replacement is compliance to your exercise  program.  Precautions: Should be implemented for the first 12 weeks following surgery- unless the surgeon specifically advises the  patient differently.  · No shoulder motion behind back ( back pocket motion)  · No excessive shoulder horizontal abduction  · No active external rotation behind head or neck  · No shoulder extension beyond the body  I. PHASE ONE - IMMEDIATE PROTECTED MOTION PHASE (Week 0-6)  Goals: Allow early healing of capsule  Restore passive range of motion  Decrease shoulder pain  Retard muscular atrophy  Patient education  Weeks 0-2  Exercises:   Sling during day and at night (worn for 4  weeks)  Continuous Passive Motion (CPM)   Passive Range of Motion  a. Flexion (0-60 degrees)  b. ER (at 30 degrees Abduction) 0 degrees  c. IR (at 30 degrees Abduction) 30 degrees   Pendulum Exercises   No active shoulder motion   Elbow/Wrist AROM   Gripping Exercises   Isometrics  a. Abductors  b. ER/IR   Cryotherapy for pain   When laying supine use pillow under arm to support glenohumeral joint  Weeks 3-4 (2/11/25 4 weeks)    Continue sling as needed   Continue PROM  a. Progress flexion to 90 degrees  b. ER/IR at 30 degrees abd scapular plane   May initiate AAROM IR/ER   Pendulum exercise   Rope and pulley week 3 to 4   Continue isometric  a. Initiate rhythmic stabilization drills   Continue use of ice  II. PHASE TWO - ACTIVE MOTION PHASE (Week 6-12) 2/25-  Goals: Improve Shoulder Strength  Gradually progress Active/Passive Range of Motion  Decrease Pain/Inflammation  Increase Functional Activities  Do not over stress healing tissue  Weeks 6-8 2/25-3/11  Exercises:   Progress PROM  a. Flexion to  degrees  b. ER/IR at 45 degrees abduction scapular plane  c. IR   Progress AAROM ER/IR at 45 degrees abd   Do not aggressively push ROM into ER   Continue rope and pulley to tolerance  Pendulum exercises   Continue isometrics  a. ER/IR  b. Rhythmic stabilization  c. Initiate deltoid flexion/ext   Ice as needed                          Manuals 3/19  2/24 2/26 3/3 3/5 3/17   PROM stretch JZ  JZ - within protocol limits JZ - within protocol limits JZ JZ - within protocol limits JZ within ROM restriction JZ within ROM restriction   Inf glide, AP mobs JZ      Grade II JZ                       Neuro Re-Ed 3/19  2/24 2/26 3/3 3/5 3/17   Row scap retract Blue 3x10     Blue 3x10 Blue 3x10    SH ext scap retract Red 3x10     Red 3x10  Red 3x10   Scap retract tricep ext Blue 3x10      Blue 3x10                       Ther Ex 3/19  2/24 2/26 3/3 3/5 3/17   Flexbar supination Red 30x    Red 2x10 ea Red 2x10  Red 2x10  Red 20x   "  Flexbar extension/extension Red 30x      Red 20x    Bicep curl 2# 2x10  3# 2x10    Bicep curl 1# 2x10  Bicep curl  1# 2x10 Bicep curl  1# 10x  2# 2x10 Bicep curl  2# 2x10  3# 2x10   Supination/pronation Tigertail          Brenda flexion standing  20x 2\" flex/abd  Scaption  and flexion  5\" 2x10 Scaption  and flexion  5\" 2x10 2\" 2x10 ea  20x 2\" flex/abd   Supine AAROM  ER     5\" 2x10 2x10  2x10 2x10   Supine b/l sh flexion AAROM 2x10 5\"   2x10  5\" 2x10 2x10  2x10 2x10   Supine chest press AROM 4x5  AAROM 4x5  AAROM 2x10                Band shoulder adduction          Isometric sh flexion     2\" 20x      Isometric sh abduction     2\" 20x      Educated on HEP   10'                 Ther Activity 3/19  2/24 2/26 3/3 3/5  3/17                                                                                                      "

## 2025-03-24 ENCOUNTER — APPOINTMENT (OUTPATIENT)
Dept: PHYSICAL THERAPY | Facility: CLINIC | Age: 55
End: 2025-03-24
Payer: MEDICARE

## 2025-03-26 ENCOUNTER — OFFICE VISIT (OUTPATIENT)
Dept: PHYSICAL THERAPY | Facility: CLINIC | Age: 55
End: 2025-03-26
Payer: MEDICARE

## 2025-03-26 DIAGNOSIS — S42.211A CLOSED DISPLACED FRACTURE OF SURGICAL NECK OF RIGHT HUMERUS, INITIAL ENCOUNTER: Primary | ICD-10-CM

## 2025-03-26 DIAGNOSIS — S42.211A CLOSED DISPLACED FRACTURE OF SURGICAL NECK OF RIGHT HUMERUS, UNSPECIFIED FRACTURE MORPHOLOGY, INITIAL ENCOUNTER: ICD-10-CM

## 2025-03-26 PROCEDURE — 97140 MANUAL THERAPY 1/> REGIONS: CPT | Performed by: PHYSICAL THERAPIST

## 2025-03-26 PROCEDURE — 97112 NEUROMUSCULAR REEDUCATION: CPT | Performed by: PHYSICAL THERAPIST

## 2025-03-26 PROCEDURE — 97110 THERAPEUTIC EXERCISES: CPT | Performed by: PHYSICAL THERAPIST

## 2025-03-26 NOTE — PROGRESS NOTES
Daily Note     Today's date: 3/26/2025  Patient name: Sarah Fisher  : 1970  MRN: [id number]  Referring provider: Rossy Franco PA-C  Dx:   Encounter Diagnosis     ICD-10-CM    1. Closed displaced fracture of surgical neck of right humerus, initial encounter  S42.211A       2. Closed displaced fracture of surgical neck of right humerus, unspecified fracture morphology, initial encounter  S42.211A              Subjective: Pt reported that she is hoping to start driving, however she is still is concerned about weakness. Stated that she has been encouraged with her ability to put the pans away at home.     Objective: See treatment diary below    Assessment: Tolerated treatment well. In order to advance strengthening initiated s/l shoulder external rotation and abduction, which she performed well without an increase in pain. She does present with significant R sh ER external rotation lag, however able to generate some force and motion with AA from therapist. Patient demonstrated fatigue post treatment, exhibited good technique with therapeutic exercises, and would benefit from continued PT    Plan: Continue per plan of care.      Precautions:   History of cervical radiculopathy    Sling till: 25  DOS: 25  REVERSE TOTAL SHOULDER REPLACEMENT  POST-OPERATIVE REHABILITATION PROGRAM  The goal of the rehabilitation process is to provide greater joint stability to the patient, while decreasing their pain and  improving their functional status. The goal of the surgery & rehab (bone loss, muscle loss) is joint stability and less joint  mobility. The key to the success of the rehabilitation following shoulder replacement is compliance to your exercise  program.  Precautions: Should be implemented for the first 12 weeks following surgery- unless the surgeon specifically advises the  patient differently.  · No shoulder motion behind back ( back pocket motion)  · No excessive shoulder horizontal abduction  · No  active external rotation behind head or neck  · No shoulder extension beyond the body  I. PHASE ONE - IMMEDIATE PROTECTED MOTION PHASE (Week 0-6)  Goals: Allow early healing of capsule  Restore passive range of motion  Decrease shoulder pain  Retard muscular atrophy  Patient education  Weeks 0-2  Exercises:   Sling during day and at night (worn for 4 weeks)  Continuous Passive Motion (CPM)   Passive Range of Motion  a. Flexion (0-60 degrees)  b. ER (at 30 degrees Abduction) 0 degrees  c. IR (at 30 degrees Abduction) 30 degrees   Pendulum Exercises   No active shoulder motion   Elbow/Wrist AROM   Gripping Exercises   Isometrics  a. Abductors  b. ER/IR   Cryotherapy for pain   When laying supine use pillow under arm to support glenohumeral joint  Weeks 3-4 (2/11/25 4 weeks)    Continue sling as needed   Continue PROM  a. Progress flexion to 90 degrees  b. ER/IR at 30 degrees abd scapular plane   May initiate AAROM IR/ER   Pendulum exercise   Rope and pulley week 3 to 4   Continue isometric  a. Initiate rhythmic stabilization drills   Continue use of ice  II. PHASE TWO - ACTIVE MOTION PHASE (Week 6-12) 2/25-  Goals: Improve Shoulder Strength  Gradually progress Active/Passive Range of Motion  Decrease Pain/Inflammation  Increase Functional Activities  Do not over stress healing tissue  Weeks 6-8 2/25-3/11  Exercises:   Progress PROM  a. Flexion to  degrees  b. ER/IR at 45 degrees abduction scapular plane  c. IR   Progress AAROM ER/IR at 45 degrees abd   Do not aggressively push ROM into ER   Continue rope and pulley to tolerance  Pendulum exercises   Continue isometrics  a. ER/IR  b. Rhythmic stabilization  c. Initiate deltoid flexion/ext   Ice as needed                          Manuals 3/19 3/26   3/3 3/5 3/17   PROM stretch JZ JZ   JZ JZ - within protocol limits JZ within ROM restriction JZ within ROM restriction   Inf glide, AP mobs JZ JZ     Grade II JZ                       Neuro Re-Ed 3/19 3/26   3/3 3/5  "3/17   Row scap retract Blue 3x10 Blue 3x10    Blue 3x10 Blue 3x10    SH ext scap retract Red 3x10 Red 3x10     Red 3x10  Red 3x10   Scap retract tricep ext Blue 3x10 Blue 3x10      Blue 3x10                       Ther Ex 3/19    3/3 3/5 3/17   Flexbar supination Red 30x     Red 2x10  Red 2x10  Red 20x    Flexbar extension/extension Red 30x      Red 20x    Bicep curl 2# 2x10  3# 2x10     Bicep curl  1# 2x10 Bicep curl  1# 10x  2# 2x10 Bicep curl  2# 2x10  3# 2x10   Supination/pronation Tigertail          Brenda flexion standing  20x 2\" flex/abd    2\" 2x10 ea  20x 2\" flex/abd   Supine AAROM  ER      2x10  2x10 2x10   Supine b/l sh flexion AAROM 2x10 5\"  20x 5\"   2x10  2x10 2x10   Supine chest press AROM 4x5 AROM 4x5         S/l sh ER AA by therapist  3x5         Band shoulder adduction          PB sh flexion  4x10                  Educated on HEP  5'                   Ther Activity 3/19    3/3 3/5  3/17                                                                                                        "

## 2025-04-02 ENCOUNTER — APPOINTMENT (OUTPATIENT)
Dept: RADIOLOGY | Facility: AMBULARY SURGERY CENTER | Age: 55
End: 2025-04-02
Attending: STUDENT IN AN ORGANIZED HEALTH CARE EDUCATION/TRAINING PROGRAM
Payer: MEDICARE

## 2025-04-02 ENCOUNTER — OFFICE VISIT (OUTPATIENT)
Dept: OBGYN CLINIC | Facility: CLINIC | Age: 55
End: 2025-04-02

## 2025-04-02 DIAGNOSIS — S42.211A CLOSED DISPLACED FRACTURE OF SURGICAL NECK OF RIGHT HUMERUS, UNSPECIFIED FRACTURE MORPHOLOGY, INITIAL ENCOUNTER: ICD-10-CM

## 2025-04-02 DIAGNOSIS — S42.211A CLOSED DISPLACED FRACTURE OF SURGICAL NECK OF RIGHT HUMERUS, UNSPECIFIED FRACTURE MORPHOLOGY, INITIAL ENCOUNTER: Primary | ICD-10-CM

## 2025-04-02 PROCEDURE — 99024 POSTOP FOLLOW-UP VISIT: CPT | Performed by: STUDENT IN AN ORGANIZED HEALTH CARE EDUCATION/TRAINING PROGRAM

## 2025-04-02 PROCEDURE — 73030 X-RAY EXAM OF SHOULDER: CPT

## 2025-04-02 NOTE — ASSESSMENT & PLAN NOTE
11 weeks weeks s/p Right Reverse Total Shoulder Arthroplasty for fracture performed on 1/14/2025, with continued axillary nerve palsy   Orders:    XR shoulder 2+ vw right; Future    EMG; Future  X-rays reviewed with patient which shows proper alignment and stable hardware with further translation of greater tuberosity  Recommended EMG for further evaluation of axillary nerve palsy as ongoing symptoms since prior to surgical intervention. Sensation to lateral deltoid remains unchanged. Firing of deltoid has improved, but slowly/minimally. Recommend EMG to further evaluate   Continue physical therapy  OTC pain medications as needed  Follow up in 6 weeks time

## 2025-04-02 NOTE — PROGRESS NOTES
ORTHO CARE [facility] [place]  [address]  [address]  [place] [id number] [phone]       Sarah Fisher  [id number]  1970    ORTHOPAEDIC SURGERY OUTPATIENT NOTE  4/2/2025        :  Assessment & Plan  Closed displaced fracture of surgical neck of right humerus, unspecified fracture morphology, initial encounter  11 weeks weeks s/p Right Reverse Total Shoulder Arthroplasty for fracture performed on 1/14/2025, with continued axillary nerve palsy   Orders:    XR shoulder 2+ vw right; Future    EMG; Future  X-rays reviewed with patient which shows proper alignment and stable hardware with further translation of greater tuberosity  Recommended EMG for further evaluation of axillary nerve palsy as ongoing symptoms since prior to surgical intervention. Sensation to lateral deltoid remains unchanged. Firing of deltoid has improved, but slowly/minimally. Recommend EMG to further evaluate   Continue physical therapy  OTC pain medications as needed  Follow up in 6 weeks time    HISTORY:  54 y.o. female  who is present in office for follow up and is 11 weeks post op. She states that she has limited range of motion and continues to feel lateral burning like pain that has been present since her injury and fracture. She states that she has discomfort and difficulty with range of motion of her arm. She denies any new injuries or trauma, notes diminished sensation of the lateral shoulder. Notes that the lateral shoulder burning and pain is worse at nighttime.         History reviewed. No pertinent past medical history.    Past Surgical History:   Procedure Laterality Date    DC ARTHROPLASTY GLENOHUMRL JT HEMIARTHROPLASTY Right 1/14/2025    Procedure: REVERSE SHOULDER ARTHROPLASTY;  Surgeon: Sid Low DO;  Location:  MAIN OR;  Service: Orthopedics       Social History     Socioeconomic History    Marital status: /Civil Union     Spouse name: Not on file     Number of children: Not on file    Years of education: Not on file    Highest education level: Not on file   Occupational History    Not on file   Tobacco Use    Smoking status: Every Day     Current packs/day: 0.50     Types: Cigarettes    Smokeless tobacco: Never   Substance and Sexual Activity    Alcohol use: Never    Drug use: Yes     Types: Marijuana    Sexual activity: Not on file   Other Topics Concern    Not on file   Social History Narrative    Not on file     Social Drivers of Health     Financial Resource Strain: Not At Risk (3/3/2025)    Received from [facility]    Financial Insecurity     In the last 12 months did you skip medications to save money?: No     In the last 12 months was there a time when you needed to see a doctor but could not because of cost?: No   Food Insecurity: Food Insecurity Present (3/3/2025)    Received from [facility]    Food Insecurity     In the last 12 months did you ever eat less than you felt you should because there wasn't enough money for food?: Yes   Transportation Needs: No Transportation Needs (3/3/2025)    Received from [facility]    Transportation Needs     In the last 12 months have you ever had to go without healthcare because you didn't have a way to get there?: No   Physical Activity: Not on file   Stress: Stress Concern Present (3/2/2023)    Received from [facility]    [employer] [place] - Occupational Stress Questionnaire     Feeling of Stress : Very much   Social Connections: Patient Declined (3/3/2025)    Received from [facility]    Social Connection     Do you often feel lonely?: Decline to Answer   Intimate Partner Violence: Not At Risk (3/2/2023)    Received from [facility]    Humiliation, Afraid, Rape, and Kick questionnaire     Fear of Current or Ex-Partner: No     Emotionally Abused: No     Physically Abused: No     Sexually  Abused: No   Housing Stability: At Risk (3/3/2025)    Received from [facility]    Housing Stability     Are you worried that in the next 2 months you may not have stable housing?: Yes       History reviewed. No pertinent family history.     Patient's Medications   New Prescriptions    No medications on file   Previous Medications    ACETAMINOPHEN (TYLENOL) 500 MG TABLET    Take 1 tablet (500 mg total) by mouth every 6 (six) hours as needed for mild pain for up to 60 doses    ALBUTEROL (PROVENTIL HFA,VENTOLIN HFA) 90 MCG/ACT INHALER    Inhale 2 puffs every 4 (four) hours as needed    ALPRAZOLAM (XANAX) 1 MG TABLET    Take 1 mg by mouth 3 (three) times a day    ASPIRIN 81 MG CHEWABLE TABLET    Chew 1 tablet (81 mg total) 2 (two) times a day    ATORVASTATIN (LIPITOR) 20 MG TABLET    Take 20 mg by mouth daily    GABAPENTIN (NEURONTIN) 600 MG TABLET    Take 600 mg by mouth 4 (four) times a day    NALOXONE (NARCAN) 4 MG/0.1 ML NASAL SPRAY    Administer 1 spray into a nostril. If no response after 2-3 minutes, give another dose in the other nostril using a new spray.    NAPROXEN (EC NAPROSYN) 500 MG EC TABLET    Take 1 tablet (500 mg total) by mouth 2 (two) times a day with meals for 14 days    NICOTINE (NICODERM CQ) 21 MG/24 HR TD 24 HR PATCH    APPLY 1 PATCH DAILY FOR 6 WEEKS    ONDANSETRON (ZOFRAN) 4 MG TABLET    Take 1 tablet (4 mg total) by mouth every 8 (eight) hours as needed for nausea or vomiting for up to 15 doses    QUETIAPINE FUMARATE 150 MG TABS    Take 1 tablet by mouth every evening    TRAMADOL (ULTRAM) 50 MG TABLET    Take 50 mg by mouth 3 (three) times a day as needed   Modified Medications    No medications on file   Discontinued Medications    CARISOPRODOL (SOMA) 350 MG TABLET    Take 350 mg by mouth daily    DICYCLOMINE (BENTYL) 10 MG CAPSULE    Take 1 mg by mouth daily    METHOCARBAMOL (ROBAXIN) 750 MG TABLET    TAKE 1 TABLET BY MOUTH 3 TIMES A DAY AS NEEDED FOR MUSCLE SPASMS.     MUPIROCIN (BACTROBAN) 2 % OINTMENT    Apply topically to the inside of the left and right nostrils twice daily for 5 days before surgery, including the morning of surgery.    OXYCODONE (ROXICODONE) 5 IMMEDIATE RELEASE TABLET    Take 1 tablet (5 mg total) by mouth every 4 (four) hours as needed for moderate pain for up to 12 doses Max Daily Amount: 30 mg    QUETIAPINE (SEROQUEL) 25 MG TABLET    Take 25 mg by mouth 4 (four) times a day       Allergies   Allergen Reactions    Demerol [Meperidine] Vomiting    Prednisone Other (See Comments)     Thrush          There were no vitals taken for this visit.     REVIEW OF SYSTEMS:  Constitutional: Negative.    HEENT: Negative.    Respiratory: Negative.    Skin: Negative.    Neurological: Negative.    Psychiatric/Behavioral: Negative.  Musculoskeletal: Negative except for that mentioned in the HPI.    Gen: No acute distress, resting comfortably in bed  HEENT: Eyes clear, moist mucus membranes, hearing intact  Respiratory: No audible wheezing or stridor  Cardiovascular: Well Perfused peripherally, 2+ distal pulse  Abdomen: nondistended, no peritoneal signs     PHYSICAL EXAM:      Right Shoulder Exam  Visual inspection of the right shoulder demonstrates normal contour.  Incision healed  Diminished sensation over the medial deltoid, hypersensitivity to touch over the anterior deltoid, normal sensation over the posterior deltoid.  Intact radial nerve.  Active ROM limited to forward flexion of 45, 45 degrees abduction and neutral external rotation  Passive range of motion with forward flexion to 110 degrees, abduction to 80, external rotation to 30 with the arm at the side, internal Tatian not tested    IMAGING:  X-ray of the right shoulder was review, see above    Rossy Franco PA-C    Scribe Attestation      I,:  Rossy Franco PA-C am acting as a scribe while in the presence of the attending physician.:       I,:  Sid Low DO personally performed the services described in  "this documentation    as scribed in my presence.:               Portions of the record may have been created with voice recognition software.  Occasional wrong word or \"sound a like\" substitutions may have occurred due to the inherent limitations of voice recognition software.  Read the chart carefully and recognize, using context, where substitutions have occurred. All patient's questions were answered to their satisfaction.    "

## 2025-04-07 ENCOUNTER — APPOINTMENT (OUTPATIENT)
Dept: PHYSICAL THERAPY | Facility: CLINIC | Age: 55
End: 2025-04-07
Payer: MEDICARE

## 2025-04-09 ENCOUNTER — TELEPHONE (OUTPATIENT)
Age: 55
End: 2025-04-09

## 2025-04-09 ENCOUNTER — APPOINTMENT (OUTPATIENT)
Dept: PHYSICAL THERAPY | Facility: CLINIC | Age: 55
End: 2025-04-09
Payer: MEDICARE

## 2025-04-09 DIAGNOSIS — S42.211A CLOSED DISPLACED FRACTURE OF SURGICAL NECK OF RIGHT HUMERUS, INITIAL ENCOUNTER: Primary | ICD-10-CM

## 2025-04-09 DIAGNOSIS — S42.211A CLOSED DISPLACED FRACTURE OF SURGICAL NECK OF RIGHT HUMERUS, UNSPECIFIED FRACTURE MORPHOLOGY, INITIAL ENCOUNTER: ICD-10-CM

## 2025-04-09 NOTE — TELEPHONE ENCOUNTER
Called and spoke to pt. She states yesterday she woke up and her whole hand and the inner aspect of her forearm was numb like it had fallen asleep but it didn't go back to normal all day yesterday. Today her hand is no longer numb but the inner aspect of her forearm is still numb. She also feels like she is losing mobility in her arm. Things she could do last week, like lift it up, she is unable to do anymore due to weakness and pain. She has no fever and no increase in swelling, however she still has post surgical swelling. She also states she has a knot in her palm which Dr Low has looked at before. When I asked her if she had done anything different recently like in PT she stated she did get in her bathtub and scrub it using her left hand but she held herself up with her right arm. Also while she was cleaning her bathtub she slipped and had to use both arms to catch herself, however, hat was several days before the numbness started. Please advise, thanks!

## 2025-04-09 NOTE — TELEPHONE ENCOUNTER
Spoke to patient. Relayed below. Patient will return to using sling for a few days. I gave her the number for central scheduling. She is going to call now to schedule her EMG. I advised to call back if symptoms worsen.

## 2025-04-09 NOTE — TELEPHONE ENCOUNTER
Caller: Patient     Doctor: Dr. Low    Reason for call: Patient called to get appt for EMG and the first they have available is October. They told her they could do it sooner if the order was changed to STAT  Please advise     Call back#: [phone]

## 2025-04-09 NOTE — TELEPHONE ENCOUNTER
Caller: Patient    Doctor: Dr. Low    Reason for call: Patient woke up yesterday and her arm/fingers were numb. Patient states she has numbness today in her forearm and fingers.Skin tone looks normal. The patient is unable to lift her arm above her head. Request cb to advise    Call back#: [phone] ryan Dotty

## 2025-04-11 DIAGNOSIS — S42.211A CLOSED DISPLACED FRACTURE OF SURGICAL NECK OF RIGHT HUMERUS, UNSPECIFIED FRACTURE MORPHOLOGY, INITIAL ENCOUNTER: Primary | ICD-10-CM

## 2025-04-11 NOTE — TELEPHONE ENCOUNTER
Order for Xray placed. Also spoke to Jazzy Jarvis (access center with neurodiagnostics) who said she will attempt to get he patient in sooner and will let us know when she is scheduled. I called and spoke to Sarah's daughter and advised to go to urgent care to get xrays and advised that Jazzy would be reaching out about an earlier appointment for the EMG.

## 2025-04-16 ENCOUNTER — EVALUATION (OUTPATIENT)
Dept: PHYSICAL THERAPY | Facility: CLINIC | Age: 55
End: 2025-04-16
Payer: MEDICARE

## 2025-04-16 ENCOUNTER — TELEPHONE (OUTPATIENT)
Age: 55
End: 2025-04-16

## 2025-04-16 DIAGNOSIS — S42.211A CLOSED DISPLACED FRACTURE OF SURGICAL NECK OF RIGHT HUMERUS, UNSPECIFIED FRACTURE MORPHOLOGY, INITIAL ENCOUNTER: ICD-10-CM

## 2025-04-16 DIAGNOSIS — S42.211A CLOSED DISPLACED FRACTURE OF SURGICAL NECK OF RIGHT HUMERUS, INITIAL ENCOUNTER: Primary | ICD-10-CM

## 2025-04-16 PROCEDURE — 97140 MANUAL THERAPY 1/> REGIONS: CPT | Performed by: PHYSICAL THERAPIST

## 2025-04-16 PROCEDURE — 97110 THERAPEUTIC EXERCISES: CPT | Performed by: PHYSICAL THERAPIST

## 2025-04-16 PROCEDURE — 97112 NEUROMUSCULAR REEDUCATION: CPT | Performed by: PHYSICAL THERAPIST

## 2025-04-16 NOTE — TELEPHONE ENCOUNTER
Caller: Sarah    Doctor: Dr. Low    Reason for call: Patient is asking to please fax xray for shoulder order to [phone]    Call back#: [phone]

## 2025-04-16 NOTE — PROGRESS NOTES
PT Re-Evaluation     Today's date: 2025  Patient name: Sarah Fisher  : 1970  MRN: [id number]  Referring provider: Rossy Franco PA-C  Dx:   Encounter Diagnosis     ICD-10-CM    1. Closed displaced fracture of surgical neck of right humerus, initial encounter  S42.211A                Assessment  Impairments: abnormal or restricted ROM, abnormal movement, activity intolerance, impaired physical strength, lacks appropriate home exercise program, pain with function, poor posture  and poor body mechanics  Functional limitations: overhead lift, carrying groceries, drive, reach behind back tuck in shirt, bartending    Assessment details:   25 RE:   PT reported that she had been progressing well over the first couple of months of therapy, however noted that she had a significant set back over the past month. She stated that she scrubbed her tub and since then she has been having a lot of pain and weakness. She contacted the surgeon, who ordered an x-ray and EMG of axillary nerve. She has not had either test done yet. She presents with as expected delayed ROM progression and pain due to set back and Axillary nerve palsy.  A/PROM assessment demonstrated improved flexibility and strength. Functional outcome measures and subjective reports demonstrated improved functional ability. Despite improvements she continues to present with pain,weakness, decreased ROM, poor posture. Pt would benefit from further skilled outpatient PT in order to maximize her functional ability post surgically.       Sarah Fisher is a 54 y.o. female who presents to PT post of R RTSA performed on 25 as expected since she has Axillary nerve palsy. She presents with pain, decreased strength, decreased ROM, and postural dysfunction. Due to these impairments, patient has difficulty performing ADL's, recreational activities, work-related activities, lifting/carrying, transfers, reaching. Patient's clinical presentation is consistent  with their referring diagnosis of Closed displaced fracture of surgical neck of right humerus, initial encounter  (primary encounter diagnosis), Closed displaced fracture of surgical neck of right humerus, unspecified fracture morphology, initial encounter. Patient has been educated in post-op contraindications / precautions, wound care, home exercise program, and plan of care. Patient would benefit from skilled physical therapy services to address their aforementioned functional limitations and progress towards prior level of function and independence with home exercise program.      Prognosis: fair  Prognosis details: + factors: high motivation levels, age  - factors: nerve palsy of Axillary nerve, post op reverse total shoulder, history of cervical radiculopathy     Goals  Short Term Goals to be accomplished by 3/3/25:  STG1: Pt will be I with HEP and I with posture management. Achieved.   STG3: Pt will demo shoulder PROM within post op protocol expectations. Progressing towards.  STG4: Pt will be able to generate isometric contraction with deltoid. Achieved.   STG5: Pt will report pain 5/10 in shoulder at worst. Progressing toward.      Long Term Goals to be accomplished by 5/28/25:   LTG1: Pt will be able to reach into overhead shelf without pain.  LTG2: Pt will be able to reach back to [facility] in shirt without pain.   LTG3: Pt will be able to change shirt/pants without pain.       Plan  Patient would benefit from: PT eval and skilled physical therapy  Planned modality interventions: cryotherapy, thermotherapy: hydrocollator packs and unattended electrical stimulation    Planned therapy interventions: home exercise program, therapeutic exercise, therapeutic activities, self care, patient education, manual therapy, neuromuscular re-education, joint mobilization, stretching, strengthening and flexibility      Plan of Care beginning date: 2/3/2025  Plan of Care expiration date: 5/28/2025  Treatment plan discussed  with: patient  Plan details: HEP development, stretching, strengthening, A/AA/PROM, joint mobilizations, posture education, STM/MI as needed to reduce muscle tension, muscle reeducation, PLOC discussed and agreed upon with patient.          Subjective Evaluation    History of Present Illness  Mechanism of injury: Pt is a 53 y/o R handed female who presents to PT s/p R reverse total shoulder performed on 25.   PRAVIN: stated that she had a fall as a result of LOC due to medication change and it resulted in prox humeral fracture. She went right to ER, where x-rays were ordered and she was referred to ortho. Nerve palsey was a result of the fall including Axillary nerve. Surgery was performed on 25 and she went home same day. Takes off sling to shower.  Had severe pain the next day post surgery and  Oxycodone was ordered.   25 had follow up with surgeon where x-rays were stable, wound was healing well, however presented with axillary nerve palsy (present pre operatively), diminished sensation over lateral shoulder. She was prescribed Tramadol.     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    Patient Goals  Patient goals for therapy: increased motion, decreased pain, increased strength, independence with ADLs/IADLs and return to work  Patient goal: overhead lift, carrying groceries, drive, reach behind back tuck in shirt, bartending  Pain  Current pain ratin  At best pain ratin  At worst pain ratin-10  Location: R shoulder  Quality: tight, discomfort and pulling  Relieving factors: relaxation and rest  Aggravating factors: lifting and overhead  activity    Treatments  Current treatment: medication and physical therapy        Objective     Postural Observations  Seated posture: fair  Standing posture: fair    Additional Postural Observation Details  Forward head and shoulders     Observations     Additional Observation Details  Incision: clean and healing well, minimal scabbing present, no s/s infection, no dehiscence        Active Range of Motion  Right Shoulder   Flexion: 45 degrees with scapular elevation compensation  Abduction: 45 degrees with scapular elevation compensation  Internal rotation 0°: 30 degrees   External rotation: 15 degrees       Passive Range of Motion     Right Shoulder   Flexion: 100 degrees   Abduction: 100 degrees   Internal rotation 0°: 50 degrees   External Rotation: 20 degrees     Strength/Myotome Testing     Additional Strength Details  NT: post op     Tests     Additional Tests Details  NT: post op          Precautions:   History of cervical radiculopathy    Sling till: 2/11/25  DOS: 1/14/25  REVERSE TOTAL SHOULDER REPLACEMENT  POST-OPERATIVE REHABILITATION PROGRAM  The goal of the rehabilitation process is to provide greater joint stability to the patient, while decreasing their pain and  improving their functional status. The goal of the surgery & rehab (bone loss, muscle loss) is joint stability and less joint  mobility. The key to the success of the rehabilitation following shoulder replacement is compliance to your exercise  program.  Precautions: Should be implemented for the first 12 weeks following surgery- unless the surgeon specifically advises the  patient differently.  · No shoulder motion behind back ( back pocket motion)  · No excessive shoulder horizontal abduction  · No active external rotation behind head or neck  · No shoulder extension beyond the body  I. PHASE ONE - IMMEDIATE PROTECTED MOTION PHASE (Week 0-6)  Goals: Allow early healing of capsule  Restore passive range of motion  Decrease shoulder  "pain  Retard muscular atrophy  Patient education  Weeks 0-2  Exercises:   Sling during day and at night (worn for 4 weeks)  Continuous Passive Motion (CPM)   Passive Range of Motion  a. Flexion (0-60 degrees)  b. ER (at 30 degrees Abduction) 0 degrees  c. IR (at 30 degrees Abduction) 30 degrees   Pendulum Exercises   No active shoulder motion   Elbow/Wrist AROM   Gripping Exercises   Isometrics  a. Abductors  b. ER/IR   Cryotherapy for pain   When laying supine use pillow under arm to support glenohumeral joint                        Manuals 3/19 3/26 4/16  3/3 3/5 3/17   PROM stretch JZ JZ JZ  JZ JZ - within protocol limits JZ within ROM restriction JZ within ROM restriction   Inf glide, AP mobs JZ JZ JZ    Grade II JZ   Flexion AROM          Abduction AROM                    Neuro Re-Ed 3/19 3/26 4/16  3/3 3/5 3/17   Row scap retract Blue 3x10 Blue 3x10    Blue 3x10 Blue 3x10    SH ext scap retract Red 3x10 Red 3x10     Red 3x10  Red 3x10   Scap retract tricep ext Blue 3x10 Blue 3x10      Blue 3x10   Prone I   2x10       Prone T    2x10       Prone Y   2x10                           Ther Ex 3/19  4/16  3/3 3/5 3/17   Flexbar supination Red 30x     Red 2x10  Red 2x10  Red 20x    Flexbar extension/extension Red 30x      Red 20x    Bicep curl 2# 2x10  3# 2x10   3# 2x10  Bicep curl  1# 2x10 Bicep curl  1# 10x  2# 2x10 Bicep curl  2# 2x10  3# 2x10   Supination/pronation Tigertail          Brenda flexion standing  20x 2\" flex/abd    2\" 2x10 ea  20x 2\" flex/abd   Supine AAROM  ER      2x10  2x10 2x10   Supine b/l sh flexion AAROM 2x10 5\"  20x 5\" Wand 2x10  2x10  2x10 2x10   Supine chest press AROM 4x5 AROM 4x5  Wand 2x10       S/l sh ER AA by therapist  3x5         Band shoulder adduction          PB sh flexion  4x10 2x10                 Educated on HEP  5'                   Ther Activity 3/19  4/16  3/3 3/5  3/17                                                                                     "

## 2025-04-21 ENCOUNTER — OFFICE VISIT (OUTPATIENT)
Dept: PHYSICAL THERAPY | Facility: CLINIC | Age: 55
End: 2025-04-21
Payer: MEDICARE

## 2025-04-21 DIAGNOSIS — S42.211A CLOSED DISPLACED FRACTURE OF SURGICAL NECK OF RIGHT HUMERUS, INITIAL ENCOUNTER: Primary | ICD-10-CM

## 2025-04-21 DIAGNOSIS — S42.211A CLOSED DISPLACED FRACTURE OF SURGICAL NECK OF RIGHT HUMERUS, UNSPECIFIED FRACTURE MORPHOLOGY, INITIAL ENCOUNTER: ICD-10-CM

## 2025-04-21 PROCEDURE — 97112 NEUROMUSCULAR REEDUCATION: CPT | Performed by: PHYSICAL THERAPIST

## 2025-04-21 PROCEDURE — 97140 MANUAL THERAPY 1/> REGIONS: CPT | Performed by: PHYSICAL THERAPIST

## 2025-04-21 PROCEDURE — 97110 THERAPEUTIC EXERCISES: CPT | Performed by: PHYSICAL THERAPIST

## 2025-04-21 NOTE — PROGRESS NOTES
"Daily Note     Today's date: 2025  Patient name: Sarah Fisher  : 1970  MRN: [id number]  Referring provider: Rossy Franco PA-C  Dx:   Encounter Diagnosis     ICD-10-CM    1. Closed displaced fracture of surgical neck of right humerus, initial encounter  S42.211A       2. Closed displaced fracture of surgical neck of right humerus, unspecified fracture morphology, initial encounter  S42.211A              Subjective: Pt reported that \"I felt good after the last visit.\" Noted that \"I felt like I could move more.\" Pt reported that she plans to start working for [employer] today. She stated that she is excited to start back to work.     Objective: See treatment diary below    Assessment: Tolerated treatment well. Demonstrated improved flexibility and joint mobility following manual treatment.  Continues to present with significant scapular weakness with Patient demonstrated fatigue post treatment, exhibited good technique with therapeutic exercises, and would benefit from continued PT    Plan: Continue per plan of care.      Precautions:   History of cervical radiculopathy    Sling till: 25  DOS: 25  REVERSE TOTAL SHOULDER REPLACEMENT  POST-OPERATIVE REHABILITATION PROGRAM  The goal of the rehabilitation process is to provide greater joint stability to the patient, while decreasing their pain and  improving their functional status. The goal of the surgery & rehab (bone loss, muscle loss) is joint stability and less joint  mobility. The key to the success of the rehabilitation following shoulder replacement is compliance to your exercise  program.  Precautions: Should be implemented for the first 12 weeks following surgery- unless the surgeon specifically advises the  patient differently.  · No shoulder motion behind back ( back pocket motion)  · No excessive shoulder horizontal abduction  · No active external rotation behind head or neck  · No shoulder extension beyond the body  I. PHASE ONE - " IMMEDIATE PROTECTED MOTION PHASE (Week 0-6)  Goals: Allow early healing of capsule  Restore passive range of motion  Decrease shoulder pain  Retard muscular atrophy  Patient education  Weeks 0-2  Exercises:   Sling during day and at night (worn for 4 weeks)  Continuous Passive Motion (CPM)   Passive Range of Motion  a. Flexion (0-60 degrees)  b. ER (at 30 degrees Abduction) 0 degrees  c. IR (at 30 degrees Abduction) 30 degrees   Pendulum Exercises   No active shoulder motion   Elbow/Wrist AROM   Gripping Exercises   Isometrics  a. Abductors  b. ER/IR   Cryotherapy for pain   When laying supine use pillow under arm to support glenohumeral joint  Weeks 3-4 (2/11/25 4 weeks)    Continue sling as needed   Continue PROM  a. Progress flexion to 90 degrees  b. ER/IR at 30 degrees abd scapular plane   May initiate AAROM IR/ER   Pendulum exercise   Rope and pulley week 3 to 4   Continue isometric  a. Initiate rhythmic stabilization drills   Continue use of ice  II. PHASE TWO - ACTIVE MOTION PHASE (Week 6-12) 2/25-  Goals: Improve Shoulder Strength  Gradually progress Active/Passive Range of Motion  Decrease Pain/Inflammation  Increase Functional Activities  Do not over stress healing tissue  Weeks 6-8 2/25-3/11  Exercises:   Progress PROM  a. Flexion to  degrees  b. ER/IR at 45 degrees abduction scapular plane  c. IR   Progress AAROM ER/IR at 45 degrees abd   Do not aggressively push ROM into ER   Continue rope and pulley to tolerance  Pendulum exercises   Continue isometrics  a. ER/IR  b. Rhythmic stabilization  c. Initiate deltoid flexion/ext   Ice as needed                          Manuals 3/19 3/26 4/16 4/21  3/5 3/17   PROM stretch JZ JZ JZ JZ  JZ within ROM restriction JZ within ROM restriction   Inf glide, AP mobs JZ JZ JZ JZ   Grade II JZ   Flexion AROM          Abduction AROM                    Neuro Re-Ed 3/19 3/26 4/16 4/21  3/5 3/17   Row scap retract Blue 3x10 Blue 3x10  Blue 3x10   Blue 3x10 Blue 3x10   "  SH ext scap retract Red 3x10 Red 3x10     Red 3x10  Red 3x10   Scap retract tricep ext Blue 3x10 Blue 3x10      Blue 3x10   Prone I   2x10 2x10      Prone T    2x10 2x10      Prone Y   2x10 2x10      Prone Row    2x10                Ther Ex 3/19  4/16 4/21  3/5 3/17   Flexbar supination Red 30x    Red 2x10  Red 2x10  Red 20x    Flexbar extension/extension Red 30x   Red 2x10   Red 20x    Bicep curl 2# 2x10  3# 2x10   3# 2x10 3# 2x10  Bicep curl  1# 10x  2# 2x10 Bicep curl  2# 2x10  3# 2x10   Supination/pronation Tigertail          Brenda flexion standing  20x 2\" flex/abd      20x 2\" flex/abd   Supine AAROM  ER       2x10 2x10   Supine b/l sh flexion AAROM 2x10 5\"  20x 5\" Wand 2x10 Wand  2x10   2x10 2x10   Supine chest press AROM 4x5 AROM 4x5  Wand 2x10 Wand 2x10      S/l sh ER AA by therapist  3x5         Band shoulder adduction          PB sh flexion  4x10 2x10 2x10                 Educated on HEP  5'                   Ther Activity 3/19  4/16 4/21  3/5  3/17                                                                                                            "

## 2025-04-22 ENCOUNTER — HOSPITAL ENCOUNTER (OUTPATIENT)
Dept: NEUROLOGY | Facility: CLINIC | Age: 55
Discharge: HOME | End: 2025-04-22
Payer: MEDICARE

## 2025-04-22 DIAGNOSIS — S42.211A CLOSED DISPLACED FRACTURE OF SURGICAL NECK OF RIGHT HUMERUS, UNSPECIFIED FRACTURE MORPHOLOGY, INITIAL ENCOUNTER: ICD-10-CM

## 2025-04-22 PROBLEM — G56.90 AXILLARY NEUROPATHY: Status: ACTIVE | Noted: 2025-04-22

## 2025-04-22 PROCEDURE — 95910 NRV CNDJ TEST 7-8 STUDIES: CPT | Performed by: PSYCHIATRY & NEUROLOGY

## 2025-04-22 PROCEDURE — 95886 MUSC TEST DONE W/N TEST COMP: CPT | Performed by: PSYCHIATRY & NEUROLOGY

## 2025-04-23 ENCOUNTER — APPOINTMENT (OUTPATIENT)
Dept: PHYSICAL THERAPY | Facility: CLINIC | Age: 55
End: 2025-04-23
Payer: MEDICARE

## 2025-04-28 ENCOUNTER — APPOINTMENT (OUTPATIENT)
Dept: PHYSICAL THERAPY | Facility: CLINIC | Age: 55
End: 2025-04-28
Payer: MEDICARE

## 2025-04-30 ENCOUNTER — APPOINTMENT (OUTPATIENT)
Dept: PHYSICAL THERAPY | Facility: CLINIC | Age: 55
End: 2025-04-30
Payer: MEDICARE

## 2025-04-30 DIAGNOSIS — S42.211A CLOSED DISPLACED FRACTURE OF SURGICAL NECK OF RIGHT HUMERUS, INITIAL ENCOUNTER: Primary | ICD-10-CM

## 2025-04-30 DIAGNOSIS — S42.211A CLOSED DISPLACED FRACTURE OF SURGICAL NECK OF RIGHT HUMERUS, UNSPECIFIED FRACTURE MORPHOLOGY, INITIAL ENCOUNTER: ICD-10-CM

## 2025-05-07 ENCOUNTER — OFFICE VISIT (OUTPATIENT)
Dept: PHYSICAL THERAPY | Facility: CLINIC | Age: 55
End: 2025-05-07
Payer: MEDICARE

## 2025-05-07 DIAGNOSIS — S42.211A CLOSED DISPLACED FRACTURE OF SURGICAL NECK OF RIGHT HUMERUS, UNSPECIFIED FRACTURE MORPHOLOGY, INITIAL ENCOUNTER: ICD-10-CM

## 2025-05-07 DIAGNOSIS — S42.211A CLOSED DISPLACED FRACTURE OF SURGICAL NECK OF RIGHT HUMERUS, INITIAL ENCOUNTER: Primary | ICD-10-CM

## 2025-05-07 PROCEDURE — 97140 MANUAL THERAPY 1/> REGIONS: CPT

## 2025-05-07 PROCEDURE — 97112 NEUROMUSCULAR REEDUCATION: CPT

## 2025-05-07 PROCEDURE — 97110 THERAPEUTIC EXERCISES: CPT

## 2025-05-07 NOTE — PROGRESS NOTES
Daily Note     Today's date: 2025  Patient name: Sarah Fisher  : 1970  MRN: [id number]  Referring provider: Rossy Franco PA-C  Dx:   Encounter Diagnosis     ICD-10-CM    1. Closed displaced fracture of surgical neck of right humerus, initial encounter  S42.211A       2. 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 Patient demonstrated fatigue post treatment and would benefit from continued PT      Plan: Continue per plan of care.      Precautions:   History of cervical radiculopathy    Sling till: 25  DOS: 25  REVERSE TOTAL SHOULDER REPLACEMENT  POST-OPERATIVE REHABILITATION PROGRAM  The goal of the rehabilitation process is to provide greater joint stability to the patient, while decreasing their pain and  improving their functional status. The goal of the surgery & rehab (bone loss, muscle loss) is joint stability and less joint  mobility. The key to the success of the rehabilitation following shoulder replacement is compliance to your exercise  program.  Precautions: Should be implemented for the first 12 weeks following surgery- unless the surgeon specifically advises the  patient differently.  · No shoulder motion behind back ( back pocket motion)  · No excessive shoulder horizontal abduction  · No active external rotation behind head or neck  · No shoulder extension beyond the body  I. PHASE ONE - IMMEDIATE PROTECTED MOTION PHASE (Week 0-6)  Goals: Allow early healing of capsule  Restore passive range of motion  Decrease shoulder pain  Retard muscular atrophy  Patient education  Weeks 0-2  Exercises:   Sling during day and at night (worn for 4 weeks)  Continuous  Passive Motion (CPM)   Passive Range of Motion  a. Flexion (0-60 degrees)  b. ER (at 30 degrees Abduction) 0 degrees  c. IR (at 30 degrees Abduction) 30 degrees   Pendulum Exercises   No active shoulder motion   Elbow/Wrist AROM   Gripping Exercises   Isometrics  a. Abductors  b. ER/IR   Cryotherapy for pain   When laying supine use pillow under arm to support glenohumeral joint  Weeks 3-4 (2/11/25 4 weeks)    Continue sling as needed   Continue PROM  a. Progress flexion to 90 degrees  b. ER/IR at 30 degrees abd scapular plane   May initiate AAROM IR/ER   Pendulum exercise   Rope and pulley week 3 to 4   Continue isometric  a. Initiate rhythmic stabilization drills   Continue use of ice  II. PHASE TWO - ACTIVE MOTION PHASE (Week 6-12) 2/25-  Goals: Improve Shoulder Strength  Gradually progress Active/Passive Range of Motion  Decrease Pain/Inflammation  Increase Functional Activities  Do not over stress healing tissue  Weeks 6-8 2/25-3/11  Exercises:   Progress PROM  a. Flexion to  degrees  b. ER/IR at 45 degrees abduction scapular plane  c. IR   Progress AAROM ER/IR at 45 degrees abd   Do not aggressively push ROM into ER   Continue rope and pulley to tolerance  Pendulum exercises   Continue isometrics  a. ER/IR  b. Rhythmic stabilization  c. Initiate deltoid flexion/ext   Ice as needed                          Manuals 3/19 3/26 4/16 4/21 5/7  3/17   PROM stretch JZ JZ JZ JZ DL  JZ within ROM restriction   Inf glide, AP mobs JZ JZ JZ JZ DL  Grade II JZ   Flexion AROM          Abduction AROM                    Neuro Re-Ed 3/19 3/26 4/16 4/21   3/17   Row scap retract Blue 3x10 Blue 3x10  Blue 3x10  Blue 3x10  Blue 3x10    SH ext scap retract Red 3x10 Red 3x10    Ext blue 3x10  Red 3x10   Scap retract tricep ext Blue 3x10 Blue 3x10      Blue 3x10   Prone I   2x10 2x10 2x10     Prone T    2x10 2x10 2x10     Prone Y   2x10 2x10 2x10     Prone Row    2x10 2x10               Ther Ex 3/19  4/16 4/21   3/17   Flexbar  "supination Red 30x    Red 2x10 Red 2x10  Red 20x    Flexbar extension/extension Red 30x   Red 2x10 Red 2x10  Red 20x    Bicep curl 2# 2x10  3# 2x10   3# 2x10 3# 2x10 3#  2x10  Bicep curl  2# 2x10  3# 2x10   Supination/pronation Tigertail          Brenda flexion standing  20x 2\" flex/abd      20x 2\" flex/abd   Supine AAROM  ER        2x10   Supine b/l sh flexion AAROM 2x10 5\"  20x 5\" Wand 2x10 Wand  2x10  Wand 2x10  2x10   Supine chest press AROM 4x5 AROM 4x5  Wand 2x10 Wand 2x10 Wand 2x10     S/l sh ER AA by therapist  3x5         Band shoulder adduction          PB sh flexion  4x10 2x10 2x10                 Educated on HEP  5'                   Ther Activity 3/19  4/16 4/21   3/17                                                                                                              "

## 2025-05-12 ENCOUNTER — APPOINTMENT (OUTPATIENT)
Dept: PHYSICAL THERAPY | Facility: CLINIC | Age: 55
End: 2025-05-12
Payer: MEDICARE

## 2025-05-13 DIAGNOSIS — S42.211A CLOSED DISPLACED FRACTURE OF SURGICAL NECK OF RIGHT HUMERUS, UNSPECIFIED FRACTURE MORPHOLOGY, INITIAL ENCOUNTER: Primary | ICD-10-CM

## 2025-05-14 ENCOUNTER — APPOINTMENT (OUTPATIENT)
Dept: PHYSICAL THERAPY | Facility: CLINIC | Age: 55
End: 2025-05-14
Payer: MEDICARE

## 2025-05-14 ENCOUNTER — HOSPITAL ENCOUNTER (OUTPATIENT)
Dept: RADIOLOGY | Facility: HOSPITAL | Age: 55
Discharge: HOME | End: 2025-05-14
Payer: MEDICARE

## 2025-05-14 DIAGNOSIS — S42.211A CLOSED DISPLACED FRACTURE OF SURGICAL NECK OF RIGHT HUMERUS, UNSPECIFIED FRACTURE MORPHOLOGY, INITIAL ENCOUNTER: ICD-10-CM

## 2025-05-14 PROCEDURE — 73030 X-RAY EXAM OF SHOULDER: CPT

## 2025-05-15 ENCOUNTER — OFFICE VISIT (OUTPATIENT)
Dept: OBGYN CLINIC | Facility: CLINIC | Age: 55
End: 2025-05-15
Payer: MEDICARE

## 2025-05-15 VITALS — HEIGHT: 64 IN | WEIGHT: 160 LBS | BODY MASS INDEX: 27.31 KG/M2

## 2025-05-15 DIAGNOSIS — S42.211A CLOSED DISPLACED FRACTURE OF SURGICAL NECK OF RIGHT HUMERUS, UNSPECIFIED FRACTURE MORPHOLOGY, INITIAL ENCOUNTER: Primary | ICD-10-CM

## 2025-05-15 PROCEDURE — 99213 OFFICE O/P EST LOW 20 MIN: CPT | Performed by: STUDENT IN AN ORGANIZED HEALTH CARE EDUCATION/TRAINING PROGRAM

## 2025-05-15 RX ORDER — QUETIAPINE FUMARATE 300 MG/1
300 TABLET, FILM COATED ORAL
COMMUNITY
Start: 2025-04-26

## 2025-05-15 RX ORDER — MECLIZINE HYDROCHLORIDE 25 MG/1
25 TABLET ORAL 3 TIMES DAILY PRN
COMMUNITY
Start: 2025-05-08

## 2025-05-15 NOTE — LETTER
May 15, 2025     Patient: Sarah Fisher  YOB: 1970  Date of Visit: 5/15/2025      To Whom it May Concern:    Sarah Fisher is under my professional care. Sarah was seen in my office on 5/15/2025. Sarah is cleared for colonoscopy.    If you have any questions or concerns, please don't hesitate to call.         Sincerely,          Sid Low, DO        CC: No Recipients

## 2025-05-15 NOTE — ASSESSMENT & PLAN NOTE
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

## 2025-05-15 NOTE — PROGRESS NOTES
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 female  who is present in office for follow up and is 4 months post op. 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 He notes that she has lateral shoulder pain and discomfort and anterior shoulder pain that is dull and achy and has some burning sensation. She notices that she continues to have issues with her ROM. She is taking naproxen, tylenol and tramadol for pain. She notes that her neck pain has gotten worse since her return to work. She notes she had an EMG which shows that she has damage to her nerves. Denies any numbness or tingling into the hands or fingers      History reviewed. No pertinent past medical history.    Past Surgical History:   Procedure Laterality Date    ME ARTHROPLASTY GLENOHUMRL JT HEMIARTHROPLASTY Right 1/14/2025    Procedure: REVERSE SHOULDER ARTHROPLASTY;  Surgeon: Sid Low DO;  Location:  MAIN OR;  Service: Orthopedics       Social History     Socioeconomic History    Marital status: /Civil Union     Spouse name: Not on file    Number of children: Not on file    Years of education: Not on file    Highest education level: Not on file   Occupational History    Not on file   Tobacco Use     Smoking status: Every Day     Current packs/day: 0.50     Types: Cigarettes    Smokeless tobacco: Never   Substance and Sexual Activity    Alcohol use: Never    Drug use: Yes     Types: Marijuana    Sexual activity: Not on file   Other Topics Concern    Not on file   Social History Narrative    Not on file     Social Drivers of Health     Financial Resource Strain: Not At Risk (3/3/2025)    Received from [facility]    Financial Insecurity     In the last 12 months did you skip medications to save money?: No     In the last 12 months was there a time when you needed to see a doctor but could not because of cost?: No   Food Insecurity: Food Insecurity Present (3/3/2025)    Received from [facility]    Food Insecurity     In the last 12 months did you ever eat less than you felt you should because there wasn't enough money for food?: Yes   Transportation Needs: No Transportation Needs (3/3/2025)    Received from [facility]    Transportation Needs     In the last 12 months have you ever had to go without healthcare because you didn't have a way to get there?: No   Physical Activity: Not on file   Stress: Stress Concern Present (3/2/2023)    Received from [facility]    [place] [employer] - Occupational Stress Questionnaire     Feeling of Stress : Very much   Social Connections: Patient Declined (3/3/2025)    Received from [facility]    Social Connection     Do you often feel lonely?: Decline to Answer   Intimate Partner Violence: Not At Risk (3/2/2023)    Received from [facility]    Humiliation, Afraid, Rape, and Kick questionnaire     Fear of Current or Ex-Partner: No     Emotionally Abused: No     Physically Abused: No     Sexually Abused: No   Housing Stability: At Risk (3/3/2025)    Received from [facility]    Housing Stability     Are you worried that in the next 2 months  you may not have stable housing?: Yes       History reviewed. No pertinent family history.     Patient's Medications   New Prescriptions    No medications on file   Previous Medications    ACETAMINOPHEN (TYLENOL) 500 MG TABLET    Take 1 tablet (500 mg total) by mouth every 6 (six) hours as needed for mild pain for up to 60 doses    ALBUTEROL (PROVENTIL HFA,VENTOLIN HFA) 90 MCG/ACT INHALER    Inhale 2 puffs every 4 (four) hours as needed    ALPRAZOLAM (XANAX) 1 MG TABLET    Take 1 mg by mouth in the morning and 1 mg in the evening and 1 mg before bedtime.    ASPIRIN 81 MG CHEWABLE TABLET    Chew 1 tablet (81 mg total) 2 (two) times a day    ATORVASTATIN (LIPITOR) 20 MG TABLET    Take 20 mg by mouth in the morning.    GABAPENTIN (NEURONTIN) 600 MG TABLET    Take 600 mg by mouth in the morning and 600 mg at noon and 600 mg in the evening and 600 mg before bedtime.    MECLIZINE (ANTIVERT) 25 MG TABLET    Take 25 mg by mouth Three times daily as needed    NALOXONE (NARCAN) 4 MG/0.1 ML NASAL SPRAY    Administer 1 spray into a nostril. If no response after 2-3 minutes, give another dose in the other nostril using a new spray.    NAPROXEN (EC NAPROSYN) 500 MG EC TABLET    Take 1 tablet (500 mg total) by mouth 2 (two) times a day with meals for 14 days    NICOTINE (NICODERM CQ) 21 MG/24 HR TD 24 HR PATCH        ONDANSETRON (ZOFRAN) 4 MG TABLET    Take 1 tablet (4 mg total) by mouth every 8 (eight) hours as needed for nausea or vomiting for up to 15 doses    QUETIAPINE (SEROQUEL) 300 MG TABLET    Take 300 mg by mouth daily at bedtime    QUETIAPINE FUMARATE 150 MG TABS    Take 1 tablet by mouth every evening    TRAMADOL (ULTRAM) 50 MG TABLET    Take 50 mg by mouth as needed in the morning and 50 mg as needed at noon and 50 mg as needed in the evening.   Modified Medications    No medications on file   Discontinued Medications    No medications on file       Allergies   Allergen Reactions    Demerol [Meperidine] Vomiting     "Prednisone Other (See Comments)     Thrush          Ht 5' 3.5\" (1.613 m)   Wt 72.6 kg (160 lb)   BMI 27.90 kg/m²      REVIEW OF SYSTEMS:  Constitutional: Negative.    HEENT: Negative.    Respiratory: Negative.    Skin: Negative.    Neurological: Negative.    Psychiatric/Behavioral: Negative.  Musculoskeletal: Negative except for that mentioned in the HPI.    Gen: No acute distress, resting comfortably in bed  HEENT: Eyes clear, moist mucus membranes, hearing intact  Respiratory: No audible wheezing or stridor  Cardiovascular: Well Perfused peripherally, 2+ distal pulse  Abdomen: nondistended, no peritoneal signs     PHYSICAL EXAM:      Right Shoulder Exam  Visual inspection of the right shoulder demonstrates normal contour.  Incision healed  Diminished sensation over the middle deltoid, hypersensitivity to touch over the anterior deltoid, normal sensation over the posterior deltoid.  Intact radial nerve.  Active ROM limited to forward flexion of 90, 80 degrees abduction and neutral external rotation  Passive range of motion with forward flexion to 110 degrees, abduction to 80, external rotation to 30 with the arm at the side, internal Tatian not tested    IMAGING:  X-ray of the right shoulder was reviewed.  Stable alignment of reverse shoulder arthroplasty done for comminuted proximal humerus fracture.  There is continued resorption of the greater tuberosity fragment and stable alignment of the medial calcar fragment.  There is no interval loosening or scapular fracture.  I disagree with the radiologist interpretation, who commented that this was a periprosthetic fracture in nature.    Rossy Franco PA-C    Scribe Attestation      I,:  Rossy Franco PA-C am acting as a scribe while in the presence of the attending physician.:       I,:  Sid Low DO personally performed the services described in this documentation    as scribed in my presence.:               Portions of the record may have been created with " "voice recognition software.  Occasional wrong word or \"sound a like\" substitutions may have occurred due to the inherent limitations of voice recognition software.  Read the chart carefully and recognize, using context, where substitutions have occurred. All patient's questions were answered to their satisfaction.    "

## 2025-05-19 ENCOUNTER — APPOINTMENT (OUTPATIENT)
Dept: PHYSICAL THERAPY | Facility: CLINIC | Age: 55
End: 2025-05-19
Payer: MEDICARE

## 2025-05-21 ENCOUNTER — APPOINTMENT (OUTPATIENT)
Dept: PHYSICAL THERAPY | Facility: CLINIC | Age: 55
End: 2025-05-21
Payer: MEDICARE

## 2025-05-28 ENCOUNTER — APPOINTMENT (OUTPATIENT)
Dept: PHYSICAL THERAPY | Facility: CLINIC | Age: 55
End: 2025-05-28
Payer: MEDICARE

## 2025-05-30 ENCOUNTER — TELEPHONE (OUTPATIENT)
Age: 55
End: 2025-05-30

## 2025-05-30 NOTE — TELEPHONE ENCOUNTER
Caller: Sarah    Doctor: Dr. Low    Reason for call: patient has noticed increased pain/stabbing feeling in the upper right arm.  At times it feels like sometimes her muscle is being twisted. Started about a week ago.  Been using OTC meds and tramadol with little relief.  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       Call back#: [phone]

## 2025-06-01 NOTE — TELEPHONE ENCOUNTER
I unfortunately can't give her additional any pain meds at this time. She has previously gotten prescriptions for naproxen and tylenol which I can refill for her if she wishes. The stabbing pain is from her nerve injury that was present pre-op at the time of her fall. As the nerve recovers she may experience shock or electric like pain. If she would like to see a pain management physician to discuss the possibility of chronic pain prescription I can provide a referral.

## 2025-06-02 NOTE — TELEPHONE ENCOUNTER
Spoke to patient and relayed below. She does not want a refill of tylenol or naproxen as these do not help. She declined apt with pain management as well.

## 2025-06-26 ENCOUNTER — TELEPHONE (OUTPATIENT)
Dept: OBGYN CLINIC | Facility: HOSPITAL | Age: 55
End: 2025-06-26

## 2025-08-21 ENCOUNTER — OFFICE VISIT (OUTPATIENT)
Dept: OBGYN CLINIC | Facility: CLINIC | Age: 55
End: 2025-08-21
Payer: MEDICARE

## 2025-08-21 VITALS — WEIGHT: 159 LBS | HEIGHT: 64 IN | BODY MASS INDEX: 27.14 KG/M2

## 2025-08-21 DIAGNOSIS — S42.211D CLOSED DISPLACED FRACTURE OF SURGICAL NECK OF RIGHT HUMERUS WITH ROUTINE HEALING, UNSPECIFIED FRACTURE MORPHOLOGY, SUBSEQUENT ENCOUNTER: Primary | ICD-10-CM

## 2025-08-21 PROCEDURE — 99213 OFFICE O/P EST LOW 20 MIN: CPT | Performed by: STUDENT IN AN ORGANIZED HEALTH CARE EDUCATION/TRAINING PROGRAM

## (undated) DEVICE — CAUTERY TIP POLISHER: Brand: DEVON

## (undated) DEVICE — GLOVE SRG BIOGEL 7.5

## (undated) DEVICE — SUT VICRYL 0 CT-1 27 IN J260H

## (undated) DEVICE — SUT ETHIBOND 2 V-37 30 IN MX69GA

## (undated) DEVICE — SPONGE LAP 18 X 18 IN STRL RFD

## (undated) DEVICE — MAT ABSORBANT ARTHROSCOPY FLOOR 46 X 40 IN

## (undated) DEVICE — GLOVE SRG BIOGEL 8

## (undated) DEVICE — COBAN 4 IN STERILE

## (undated) DEVICE — HEAVY DUTY TABLE COVER: Brand: CONVERTORS

## (undated) DEVICE — INTENT TO BE USED WITH SUTURE MATERIAL FOR TISSUE CLOSURE: Brand: RICHARD-ALLAN® NEEDLE 1/2 CIRCLE TAPER

## (undated) DEVICE — 3M™ IOBAN™ 2 ANTIMICROBIAL INCISE DRAPE 6650EZ: Brand: IOBAN™ 2

## (undated) DEVICE — SCD SEQUENTIAL COMPRESSION COMFORT SLEEVE MEDIUM KNEE LENGTH: Brand: KENDALL SCD

## (undated) DEVICE — DUAL CUT SAGITTAL BLADE

## (undated) DEVICE — DYNANITE VIP GLENOID PIN, NITINOL, 2.8MM
Type: IMPLANTABLE DEVICE | Site: SHOULDER | Status: NON-FUNCTIONAL
Brand: ARTHREX®
Removed: 2025-01-14

## (undated) DEVICE — PACK MAJOR ORTHO W/SPLITS PBDS

## (undated) DEVICE — SURGICAL GOWN, XL SMARTSLEEVE: Brand: CONVERTORS

## (undated) DEVICE — INTENDED FOR TISSUE SEPARATION, AND OTHER PROCEDURES THAT REQUIRE A SHARP SURGICAL BLADE TO PUNCTURE OR CUT.: Brand: BARD-PARKER SAFETY BLADES SIZE 10, STERILE

## (undated) DEVICE — EXOFIN PRECISION PEN HIGH VISCOSITY TOPICAL SKIN ADHESIVE: Brand: EXOFIN PRECISION PEN, 1G

## (undated) DEVICE — DRESSING MEPILEX AG BORDER POST-OP 4 X 8 IN

## (undated) DEVICE — GLOVE INDICATOR PI UNDERGLOVE SZ 7 BLUE

## (undated) DEVICE — HOOD: Brand: FLYTE, SURGICOOL

## (undated) DEVICE — SUT STRATAFIX SPIRAL PLUS 3-0 PS-2 30 X 30 CM SXMP2B408

## (undated) DEVICE — POSITIONER TRIMANO LIMB BEACH CHAIR

## (undated) DEVICE — IMPERVIOUS STOCKINETTE: Brand: DEROYAL

## (undated) DEVICE — 3M™ STERI-STRIP™ REINFORCED ADHESIVE SKIN CLOSURES, R1547, 1/2 IN X 4 IN (12 MM X 100 MM), 6 STRIPS/ENVELOPE: Brand: 3M™ STERI-STRIP™

## (undated) DEVICE — PENCIL SMOKE EVAC TELESCOPING W/TUBING

## (undated) DEVICE — T-MAX DISPOSABLE FACE MASK 8 PER BOX

## (undated) DEVICE — 3M™ STERI-DRAPE™ U-DRAPE 1015: Brand: STERI-DRAPE™

## (undated) DEVICE — SUT VICRYL 2-0 CT-2 27 IN J269H

## (undated) DEVICE — PREP SURGICAL PURPREP 26ML

## (undated) DEVICE — 450 ML BOTTLE OF 0.05% CHLORHEXIDINE GLUCONATE IN 99.95% STERILE WATER FOR IRRIGATION, USP AND APPLICATOR.: Brand: IRRISEPT ANTIMICROBIAL WOUND LAVAGE

## (undated) DEVICE — INTENDED FOR TISSUE SEPARATION, AND OTHER PROCEDURES THAT REQUIRE A SHARP SURGICAL BLADE TO PUNCTURE OR CUT.: Brand: BARD-PARKER SAFETY BLADES SIZE 15, STERILE

## (undated) DEVICE — HANDPIECE SET WITH RETRACTABLE COAXIAL FAN SPRAY TIP AND SUCTION TUBE: Brand: INTERPULSE

## (undated) DEVICE — KERLIX BANDAGE ROLL: Brand: KERLIX

## (undated) DEVICE — GLOVE SRG BIOGEL 6.5

## (undated) DEVICE — SPONGE SCRUB 4 PCT CHLORHEXIDINE

## (undated) DEVICE — DISPOSABLE OR TOWEL: Brand: CARDINAL HEALTH

## (undated) DEVICE — GLOVE INDICATOR PI UNDERGLOVE SZ 8 BLUE

## (undated) DEVICE — DRAPE SHEET THREE QUARTER